# Patient Record
Sex: MALE | Race: WHITE | NOT HISPANIC OR LATINO | Employment: OTHER | URBAN - METROPOLITAN AREA
[De-identification: names, ages, dates, MRNs, and addresses within clinical notes are randomized per-mention and may not be internally consistent; named-entity substitution may affect disease eponyms.]

---

## 2019-05-13 ENCOUNTER — APPOINTMENT (OUTPATIENT)
Dept: GENERAL RADIOLOGY | Facility: HOSPITAL | Age: 64
End: 2019-05-13

## 2019-05-13 ENCOUNTER — HOSPITAL ENCOUNTER (EMERGENCY)
Facility: HOSPITAL | Age: 64
Discharge: HOME | End: 2019-05-13
Attending: EMERGENCY MEDICINE | Admitting: EMERGENCY MEDICINE

## 2019-05-13 VITALS
DIASTOLIC BLOOD PRESSURE: 99 MMHG | WEIGHT: 187 LBS | HEART RATE: 74 BPM | SYSTOLIC BLOOD PRESSURE: 165 MMHG | RESPIRATION RATE: 16 BRPM | TEMPERATURE: 97.2 F | OXYGEN SATURATION: 98 % | BODY MASS INDEX: 28.34 KG/M2 | HEIGHT: 68 IN

## 2019-05-13 DIAGNOSIS — I20.8 STABLE ANGINA (HCC): Primary | ICD-10-CM

## 2019-05-13 LAB
ALBUMIN SERPL-MCNC: 4.4 G/DL (ref 3.5–5.2)
ALBUMIN/GLOB SERPL: 1.8 G/DL
ALP SERPL-CCNC: 64 U/L (ref 39–117)
ALT SERPL W P-5'-P-CCNC: 24 U/L (ref 1–41)
ANION GAP SERPL CALCULATED.3IONS-SCNC: 11.7 MMOL/L
AST SERPL-CCNC: 17 U/L (ref 1–40)
BASOPHILS # BLD AUTO: 0.05 10*3/MM3 (ref 0–0.2)
BASOPHILS NFR BLD AUTO: 0.7 % (ref 0–1.5)
BILIRUB SERPL-MCNC: 0.3 MG/DL (ref 0.2–1.2)
BUN BLD-MCNC: 13 MG/DL (ref 8–23)
BUN/CREAT SERPL: 9 (ref 7–25)
CALCIUM SPEC-SCNC: 9 MG/DL (ref 8.6–10.5)
CHLORIDE SERPL-SCNC: 102 MMOL/L (ref 98–107)
CO2 SERPL-SCNC: 22.3 MMOL/L (ref 22–29)
CREAT BLD-MCNC: 1.44 MG/DL (ref 0.76–1.27)
DEPRECATED RDW RBC AUTO: 42.1 FL (ref 37–54)
EOSINOPHIL # BLD AUTO: 0.11 10*3/MM3 (ref 0–0.4)
EOSINOPHIL NFR BLD AUTO: 1.6 % (ref 0.3–6.2)
ERYTHROCYTE [DISTWIDTH] IN BLOOD BY AUTOMATED COUNT: 12.8 % (ref 12.3–15.4)
GFR SERPL CREATININE-BSD FRML MDRD: 49 ML/MIN/1.73
GLOBULIN UR ELPH-MCNC: 2.4 GM/DL
GLUCOSE BLD-MCNC: 105 MG/DL (ref 65–99)
HCT VFR BLD AUTO: 44.5 % (ref 37.5–51)
HGB BLD-MCNC: 14.7 G/DL (ref 13–17.7)
HOLD SPECIMEN: NORMAL
HOLD SPECIMEN: NORMAL
IMM GRANULOCYTES # BLD AUTO: 0.03 10*3/MM3 (ref 0–0.05)
IMM GRANULOCYTES NFR BLD AUTO: 0.4 % (ref 0–0.5)
INR PPP: 1.13 (ref 0.9–1.1)
LIPASE SERPL-CCNC: 32 U/L (ref 13–60)
LYMPHOCYTES # BLD AUTO: 2.06 10*3/MM3 (ref 0.7–3.1)
LYMPHOCYTES NFR BLD AUTO: 29.9 % (ref 19.6–45.3)
MCH RBC QN AUTO: 29.5 PG (ref 26.6–33)
MCHC RBC AUTO-ENTMCNC: 33 G/DL (ref 31.5–35.7)
MCV RBC AUTO: 89.4 FL (ref 79–97)
MONOCYTES # BLD AUTO: 0.72 10*3/MM3 (ref 0.1–0.9)
MONOCYTES NFR BLD AUTO: 10.4 % (ref 5–12)
NEUTROPHILS # BLD AUTO: 3.93 10*3/MM3 (ref 1.7–7)
NEUTROPHILS NFR BLD AUTO: 57 % (ref 42.7–76)
NRBC BLD AUTO-RTO: 0 /100 WBC (ref 0–0.2)
PLATELET # BLD AUTO: 172 10*3/MM3 (ref 140–450)
PMV BLD AUTO: 11.9 FL (ref 6–12)
POTASSIUM BLD-SCNC: 3.9 MMOL/L (ref 3.5–5.2)
PROT SERPL-MCNC: 6.8 G/DL (ref 6–8.5)
PROTHROMBIN TIME: 14.2 SECONDS (ref 11.7–14.2)
RBC # BLD AUTO: 4.98 10*6/MM3 (ref 4.14–5.8)
SODIUM BLD-SCNC: 136 MMOL/L (ref 136–145)
TROPONIN T SERPL-MCNC: <0.01 NG/ML (ref 0–0.03)
WBC NRBC COR # BLD: 6.9 10*3/MM3 (ref 3.4–10.8)
WHOLE BLOOD HOLD SPECIMEN: NORMAL
WHOLE BLOOD HOLD SPECIMEN: NORMAL

## 2019-05-13 PROCEDURE — 99284 EMERGENCY DEPT VISIT MOD MDM: CPT

## 2019-05-13 PROCEDURE — 83690 ASSAY OF LIPASE: CPT | Performed by: EMERGENCY MEDICINE

## 2019-05-13 PROCEDURE — 84484 ASSAY OF TROPONIN QUANT: CPT | Performed by: EMERGENCY MEDICINE

## 2019-05-13 PROCEDURE — 93010 ELECTROCARDIOGRAM REPORT: CPT | Performed by: INTERNAL MEDICINE

## 2019-05-13 PROCEDURE — 80053 COMPREHEN METABOLIC PANEL: CPT | Performed by: EMERGENCY MEDICINE

## 2019-05-13 PROCEDURE — 93005 ELECTROCARDIOGRAM TRACING: CPT

## 2019-05-13 PROCEDURE — 85025 COMPLETE CBC W/AUTO DIFF WBC: CPT | Performed by: EMERGENCY MEDICINE

## 2019-05-13 PROCEDURE — 93005 ELECTROCARDIOGRAM TRACING: CPT | Performed by: EMERGENCY MEDICINE

## 2019-05-13 PROCEDURE — 71045 X-RAY EXAM CHEST 1 VIEW: CPT

## 2019-05-13 PROCEDURE — 85610 PROTHROMBIN TIME: CPT | Performed by: EMERGENCY MEDICINE

## 2019-05-13 RX ORDER — ISOSORBIDE MONONITRATE 60 MG/1
120 TABLET, EXTENDED RELEASE ORAL DAILY
Qty: 30 TABLET | Refills: 0 | Status: SHIPPED | OUTPATIENT
Start: 2019-05-13 | End: 2020-06-26 | Stop reason: DRUGHIGH

## 2019-05-13 RX ORDER — LOSARTAN POTASSIUM 50 MG/1
25 TABLET ORAL DAILY
COMMUNITY
End: 2021-10-21 | Stop reason: SDUPTHER

## 2019-05-13 RX ORDER — ISOSORBIDE MONONITRATE 60 MG/1
60 TABLET, EXTENDED RELEASE ORAL DAILY
COMMUNITY
End: 2019-05-13 | Stop reason: SDUPTHER

## 2019-05-13 RX ORDER — CLOPIDOGREL BISULFATE 75 MG/1
75 TABLET ORAL DAILY
COMMUNITY
End: 2019-10-22 | Stop reason: SDUPTHER

## 2019-05-13 RX ORDER — LEVOTHYROXINE SODIUM 175 UG/1
137 TABLET ORAL DAILY
COMMUNITY
End: 2021-09-24 | Stop reason: DRUGHIGH

## 2019-05-13 RX ORDER — PANTOPRAZOLE SODIUM 40 MG/1
40 TABLET, DELAYED RELEASE ORAL DAILY
COMMUNITY
End: 2019-10-24 | Stop reason: SDUPTHER

## 2019-05-13 RX ORDER — ASPIRIN 81 MG/1
81 TABLET, CHEWABLE ORAL DAILY
COMMUNITY
End: 2022-04-01 | Stop reason: HOSPADM

## 2019-05-13 RX ORDER — AMLODIPINE BESYLATE 2.5 MG/1
2.5 TABLET ORAL DAILY
COMMUNITY
End: 2019-05-14

## 2019-05-13 RX ADMIN — NITROGLYCERIN 1 INCH: 20 OINTMENT TOPICAL at 13:09

## 2019-05-13 NOTE — ED PROVIDER NOTES
EMERGENCY DEPARTMENT ENCOUNTER    CHIEF COMPLAINT  Chief Complaint: CP  History given by: patient  History limited by: nothing  Room Number: 39/39  PMD: Quang Plasencia MD  Cardiologist- Dr. Crook    HPI:  Pt is a 64 y.o. male who presents complaining of intermittent mid-sternal CP for the last 3-4 days. Pt states that he also has an intermittent, burning left sided chest discomfort. Pt states that his pain is worse with eating. Pt also complains of fatigue and dizziness. Pt states that he took NTG with mild relief of his pain. Pt states that his last cardiac catheterization was several months ago at [facility] and that there were some blockages that they were unable to stent because the artery was too small. Pt states that he called his cardiologist's office and was instructed to come to the ED for further evaluation. Pt is on Plavix for CAD.    Duration:  3-4 days  Onset: gradual  Timing: constant  Location: mid-sternal chest  Radiation: left chest  Quality: burning  Intensity/Severity: moderate  Progression: worsening  Associated Symptoms: fatigue, dizziness  Aggravating Factors: eating  Alleviating Factors: none  Previous Episodes: none mentioned  Treatment before arrival: Pt took NTGx1 with improvement of his CP.    PAST MEDICAL HISTORY  Active Ambulatory Problems     Diagnosis Date Noted   • No Active Ambulatory Problems     Resolved Ambulatory Problems     Diagnosis Date Noted   • No Resolved Ambulatory Problems     Past Medical History:   Diagnosis Date   • Hyperlipidemia    • Hypertension    • Renal disorder        PAST SURGICAL HISTORY  Past Surgical History:   Procedure Laterality Date   • CARDIAC CATHETERIZATION     • CARDIAC SURGERY     • CORONARY ANGIOPLASTY WITH STENT PLACEMENT         FAMILY HISTORY  History reviewed. No pertinent family history.    SOCIAL HISTORY  Social History     Socioeconomic History   • Marital status: Single     Spouse name: Not on file   • Number of children: Not on file    • Years of education: Not on file   • Highest education level: Not on file   Tobacco Use   • Smoking status: Current Every Day Smoker     Packs/day: 0.50     Types: Cigarettes   • Smokeless tobacco: Never Used   Substance and Sexual Activity   • Alcohol use: No     Frequency: Never   • Drug use: No       ALLERGIES  Contrast dye    REVIEW OF SYSTEMS  Review of Systems   Constitutional: Positive for fatigue. Negative for activity change, appetite change and fever.   HENT: Negative for congestion and sore throat.    Eyes: Negative.    Respiratory: Negative for cough and shortness of breath.    Cardiovascular: Positive for chest pain. Negative for leg swelling.   Gastrointestinal: Negative for abdominal pain, diarrhea and vomiting.   Endocrine: Negative.    Genitourinary: Negative for decreased urine volume and dysuria.   Musculoskeletal: Negative for neck pain.   Skin: Negative for rash and wound.   Allergic/Immunologic: Negative.    Neurological: Positive for dizziness. Negative for weakness, numbness and headaches.   Hematological: Negative.    Psychiatric/Behavioral: Negative.    All other systems reviewed and are negative.      PHYSICAL EXAM  ED Triage Vitals [05/13/19 1144]   Temp Heart Rate Resp BP SpO2   97.2 °F (36.2 °C) 89 18 172/94 97 %      Temp src Heart Rate Source Patient Position BP Location FiO2 (%)   Tympanic Monitor -- -- --       Physical Exam   Constitutional: He is oriented to person, place, and time. No distress.   HENT:   Head: Normocephalic and atraumatic.   Mouth/Throat: Oropharynx is clear and moist.   Eyes: EOM are normal. Pupils are equal, round, and reactive to light.   Neck: Normal range of motion. Neck supple.   Cardiovascular: Normal rate, regular rhythm and normal heart sounds.   Pulmonary/Chest: Effort normal and breath sounds normal. No respiratory distress. He exhibits tenderness (mild, reproducible).   Abdominal: Soft. Bowel sounds are normal. There is no tenderness. There is no  rebound and no guarding.   Musculoskeletal: Normal range of motion. He exhibits no edema.   No calf tenderness   Neurological: He is alert and oriented to person, place, and time. He has normal sensation and normal strength.   Skin: Skin is warm and dry.   Psychiatric: Mood and affect normal.   Nursing note and vitals reviewed.      LAB RESULTS  Lab Results (last 24 hours)     Procedure Component Value Units Date/Time    CBC Auto Differential [[id number]]  (Normal) Collected:  05/13/19 1209    Specimen:  Blood Updated:  05/13/19 1354     WBC 6.90 10*3/mm3      RBC 4.98 10*6/mm3      Hemoglobin 14.7 g/dL      Hematocrit 44.5 %      MCV 89.4 fL      MCH 29.5 pg      MCHC 33.0 g/dL      RDW 12.8 %      RDW-SD 42.1 fl      MPV 11.9 fL      Platelets 172 10*3/mm3      Neutrophil % 57.0 %      Lymphocyte % 29.9 %      Monocyte % 10.4 %      Eosinophil % 1.6 %      Basophil % 0.7 %      Immature Grans % 0.4 %      Neutrophils, Absolute 3.93 10*3/mm3      Lymphocytes, Absolute 2.06 10*3/mm3      Monocytes, Absolute 0.72 10*3/mm3      Eosinophils, Absolute 0.11 10*3/mm3      Basophils, Absolute 0.05 10*3/mm3      Immature Grans, Absolute 0.03 10*3/mm3      nRBC 0.0 /100 WBC     Comprehensive Metabolic Panel [[id number]]  (Abnormal) Collected:  05/13/19 1209    Specimen:  Blood Updated:  05/13/19 1330     Glucose 105 mg/dL      BUN 13 mg/dL      Creatinine 1.44 mg/dL      Sodium 136 mmol/L      Potassium 3.9 mmol/L      Chloride 102 mmol/L      CO2 22.3 mmol/L      Calcium 9.0 mg/dL      Total Protein 6.8 g/dL      Albumin 4.40 g/dL      ALT (SGPT) 24 U/L      AST (SGOT) 17 U/L      Alkaline Phosphatase 64 U/L      Total Bilirubin 0.3 mg/dL      eGFR Non African Amer 49 mL/min/1.73      Globulin 2.4 gm/dL      A/G Ratio 1.8 g/dL      BUN/Creatinine Ratio 9.0     Anion Gap 11.7 mmol/L     Narrative:       GFR Normal >60  Chronic Kidney Disease <60  Kidney Failure <15    Lipase [[id number]]  (Normal) Collected:  05/13/19 1207     Specimen:  Blood Updated:  05/13/19 1330     Lipase 32 U/L     Protime-INR [[id number]]  (Abnormal) Collected:  05/13/19 1209    Specimen:  Blood Updated:  05/13/19 1314     Protime 14.2 Seconds      INR 1.13    Troponin [[id number]]  (Normal) Collected:  05/13/19 1209    Specimen:  Blood Updated:  05/13/19 1330     Troponin T <0.010 ng/mL     Narrative:       Troponin T Reference Range:  <= 0.03 ng/mL-   Negative for AMI  >0.03 ng/mL-     Abnormal for myocardial necrosis.  Clinicians would have to utilize clinical acumen, EKG, Troponin and serial changes to determine if it is an Acute Myocardial Infarction or myocardial injury due to an underlying chronic condition.           I ordered the above labs and reviewed the results    RADIOLOGY  XR Chest 1 View            CXR shows chronic changes but nothing acute.    I ordered the above noted radiological studies. Interpreted by radiologist.  Reviewed by me in PACS.       PROCEDURES  Procedures  EKG    EKG time: 1147  Rhythm/Rate: NSR, 67  No Acute Ischemia  Non-Specific ST-T changes    Unchanged compared to prior from [facility] in July 2018    Interpreted Contemporaneously by me.  Independently viewed by me      PROGRESS AND CONSULTS     1208- Discussed the plan to order lab and imaging studies for further evaluation. Pt understands and agrees with the plan, all questions answered.    1210- Ordered blood work, troponin, lipase, PT with INR, EKG, CXR and prior medical records for further evaluation. Ordered NTG paste for CP.    1437- Placed call to Dr. Crook (cardiology) for consult.    1456- Discussed the pt's case with Dr. Crook (cardiology), who states that he knows the pt wel land the pt is stable for discharge with instructions to follow up in the office. He states that the pt has chronic angina and requested that I increase the pt's Imdur from 60mg to 90mg.    1500- Rechecked pt. Pt is resting comfortably. Notified pt of his lab and imaging results, including the  pt's troponin and CXR. Discussed the plan to discharge the pt home with instructions to increase his Imdur to 90mg. Pt states that he is already on 90mg Imdur daily. I instructed the pt to increase his Imdur to 120mg daily. I instructed the pt to follow up with Dr. Crook later this week. Pt understands and agrees with the plan, all questions answered.      MEDICAL DECISION MAKING  Results were reviewed/discussed with the patient and they were also made aware of online access. Pt also made aware that some labs, such as cultures, will not be resulted during ER visit and follow up with PMD is necessary.     MDM  Number of Diagnoses or Management Options  Stable angina (CMS/HCC):      Amount and/or Complexity of Data Reviewed  Clinical lab tests: ordered and reviewed (Troponin=<0.010)  Tests in the radiology section of CPT®: ordered and reviewed (CXR shows chronic changes but nothing acute.)  Tests in the medicine section of CPT®: ordered and reviewed (See EKG procedure note)  Decide to obtain previous medical records or to obtain history from someone other than the patient: yes  Review and summarize past medical records: yes (Pt had an ECHO in July 2018 that showed an EF=60%. )  Discuss the patient with other providers: yes (Dr. Crook (cardiology))  Independent visualization of images, tracings, or specimens: yes    Patient Progress  Patient progress: stable         DIAGNOSIS  Final diagnoses:   Stable angina (CMS/HCC)       DISPOSITION  DISCHARGE    Patient discharged in stable condition.    Reviewed implications of results, diagnosis, meds, responsibility to follow up, warning signs and symptoms of possible worsening, potential complications and reasons to return to ER.    Patient/Family voiced understanding of above instructions.    Discussed plan for discharge, as there is no emergent indication for admission. Patient referred to primary care provider for BP management due to today's BP. Pt/family is  agreeable and understands need for follow up and repeat testing.  Pt is aware that discharge does not mean that nothing is wrong but it indicates no emergency is present that requires admission and they must continue care with follow-up as given below or physician of their choice.     FOLLOW-UP  Yunior Crook MD  [address] [place] [id number] [phone]    Call in 1 day  to schedule a follow up appointment later this week         Medication List      Changed    isosorbide mononitrate 60 MG 24 hr tablet  Commonly known as:  IMDUR  Take 2 tablets by mouth Daily. .  What changed:    how much to take  additional instructions              Latest Documented Vital Signs:  As of 3:04 PM  BP- 165/99 HR- 74 Temp- 97.2 °F (36.2 °C) (Tympanic) O2 sat- 97%    --  Documentation assistance provided by tori Sher for Dr. Pineda.  Information recorded by the scribe was done at my direction and has been verified and validated by me.     Mayela Sher  05/13/19 [id number]       Landon Pineda MD  05/13/19 [id number]

## 2019-05-14 PROBLEM — E03.9 HYPOTHYROIDISM: Status: ACTIVE | Noted: 2019-05-14

## 2019-05-14 PROBLEM — I25.10 CAD (CORONARY ARTERY DISEASE): Status: ACTIVE | Noted: 2019-05-14

## 2019-05-14 PROBLEM — N18.1 CKD (CHRONIC KIDNEY DISEASE) STAGE 1, GFR 90 ML/MIN OR GREATER: Status: ACTIVE | Noted: 2019-05-14

## 2019-05-14 PROBLEM — K21.9 GERD (GASTROESOPHAGEAL REFLUX DISEASE): Status: ACTIVE | Noted: 2019-05-14

## 2019-05-14 PROBLEM — E78.5 HYPERLIPIDEMIA LDL GOAL <100: Status: ACTIVE | Noted: 2019-05-14

## 2019-05-14 RX ORDER — AMLODIPINE BESYLATE 5 MG/1
2.5 TABLET ORAL DAILY
COMMUNITY
End: 2020-02-14 | Stop reason: SDUPTHER

## 2019-05-14 RX ORDER — FUROSEMIDE 20 MG/1
20 TABLET ORAL DAILY
COMMUNITY
End: 2019-05-15

## 2019-05-15 ENCOUNTER — OFFICE VISIT (OUTPATIENT)
Dept: CARDIOLOGY | Facility: CLINIC | Age: 64
End: 2019-05-15

## 2019-05-15 VITALS
DIASTOLIC BLOOD PRESSURE: 80 MMHG | HEART RATE: 72 BPM | BODY MASS INDEX: 28.95 KG/M2 | HEIGHT: 68 IN | WEIGHT: 191 LBS | SYSTOLIC BLOOD PRESSURE: 150 MMHG

## 2019-05-15 DIAGNOSIS — I10 ESSENTIAL HYPERTENSION: ICD-10-CM

## 2019-05-15 DIAGNOSIS — N18.1 CKD (CHRONIC KIDNEY DISEASE) STAGE 1, GFR 90 ML/MIN OR GREATER: ICD-10-CM

## 2019-05-15 DIAGNOSIS — R07.2 PRECORDIAL PAIN: ICD-10-CM

## 2019-05-15 DIAGNOSIS — Z72.0 TOBACCO ABUSE DISORDER: ICD-10-CM

## 2019-05-15 DIAGNOSIS — I25.118 CORONARY ARTERY DISEASE OF NATIVE ARTERY OF NATIVE HEART WITH STABLE ANGINA PECTORIS (HCC): Primary | ICD-10-CM

## 2019-05-15 DIAGNOSIS — E78.5 HYPERLIPIDEMIA LDL GOAL <100: ICD-10-CM

## 2019-05-15 PROCEDURE — 99214 OFFICE O/P EST MOD 30 MIN: CPT | Performed by: INTERNAL MEDICINE

## 2019-05-15 NOTE — PROGRESS NOTES
Subjective:     Encounter Date:05/15/2019      Patient ID: Rishi Galicia is a 64 y.o. male.    Chief Complaint:  Chief Complaint   Patient presents with   • Chest Pain       HPI:  I saw Rishi Galicia today for evaluation of chest discomfort.  He is a pleasant 64-year-old male with a history of coronary heart disease.  He is undergone previous coronary artery bypass surgery as well as multiple prior percutaneous coronary interventions.  He reports mid chest discomfort that radiates to the left chest.  It is described as a burning throbbing sensation.  The duration is for seconds and can occur at rest or with exertion.  He denies associated shortness of breath diaphoresis or nausea.  The frequency is daily.  He denies any progressive dyspnea on exertion and does not report orthopnea or PND there is no syncope near syncope or palpitation.  He is tolerating his current medical regimen well.    Mr. Galicia has a history of hypertension which is moderately elevated today.  In addition he has a history of tobacco abuse which continues at 1/2 pack/day.  He is known to have dyslipidemia which is under treatment with Praluent.    The following portions of the patient's history were reviewed and updated as appropriate: allergies, current medications, past family history, past medical history, past social history, past surgical history and problem list.    Problem List:  Patient Active Problem List   Diagnosis   • CAD (coronary artery disease)   • CKD (chronic kidney disease) stage 1, GFR 90 ml/min or greater   • Hyperlipidemia LDL goal <100   • Hypothyroidism   • GERD (gastroesophageal reflux disease)       Past Medical History:  Past Medical History:   Diagnosis Date   • CAD (coronary artery disease)    • Chronic kidney disease    • Chronic kidney disease    • GERD (gastroesophageal reflux disease) 5/14/2019   • H/O echocardiogram 07/18/2018    LV size normal.  EF 60-65%.  Basal posterior wall hypokinesis.  AV is  "trileaflet.  Mild MR and TR.     • Hyperlipidemia    • Hypertension    • Peripheral vascular disease (CMS/HCC)    • Renal disorder        Past Surgical History:  Past Surgical History:   Procedure Laterality Date   • CARDIAC CATHETERIZATION  07/20/2018    Native 3 vessel disease.  Patent stent in the LCx.  native  LAD, non-dominant RCA has .  Patent LIMA to LAD, SVG-OM grafts.     • CARDIAC SURGERY     • CORONARY ANGIOPLASTY WITH STENT PLACEMENT     • CORONARY ANGIOPLASTY WITH STENT PLACEMENT      4/25/16 - SYNERGY stent to the distal left PDA  and distal and mid LIMA to LAD.  .  12/2/15 -  PROMUS stent ot the first obtuse marginal branch via SVG.     • CORONARY ARTERY BYPASS GRAFT  2009   • ILIAC ARTERY STENT  2016    S/P right common iliac artery stent placement and balloon angioplasty with no significant residual stenosis and balloon angioplasty of the left tibioperoneal trunk with no significant residual stenosis.  Performed b Dr. Carlo Grijalva.       Social History:  Social History     Socioeconomic History   • Marital status: Single     Spouse name: Not on file   • Number of children: Not on file   • Years of education: Not on file   • Highest education level: Not on file   Tobacco Use   • Smoking status: Current Every Day Smoker     Packs/day: 0.50     Types: Cigarettes   • Smokeless tobacco: Never Used   Substance and Sexual Activity   • Alcohol use: No     Frequency: Never   • Drug use: No       Allergies:  Allergies   Allergen Reactions   • Contrast Dye Hives   • Ranexa [Ranolazine Er] Unknown (See Comments)     unknown         ROS:  ROS       Objective:         /80   Pulse 72   Ht 172.7 cm (68\")   Wt 86.6 kg (191 lb)   BMI 29.04 kg/m²     Physical Exam    In-Office Procedure(s):  Procedures    ASCVD RIsk Score::  The ASCVD Risk score ([place] CORBIN Mcclain, et al., 2013) failed to calculate for the following reasons:    Cannot find a previous HDL lab    Cannot find a previous total cholesterol " lab    Recent Radiology:  Imaging Results (most recent)     None          Lab Review:   not applicable     Results from last 7 days   Lab Units 05/13/19  1209   SODIUM mmol/L 136   POTASSIUM mmol/L 3.9   CHLORIDE mmol/L 102   CO2 mmol/L 22.3   BUN mg/dL 13   CREATININE mg/dL 1.44*   GLUCOSE mg/dL 105*   CALCIUM mg/dL 9.0   AST (SGOT) U/L 17   ALT (SGPT) U/L 24     Results from last 7 days   Lab Units 05/13/19  1209   TROPONIN T ng/mL <0.010     Results from last 7 days   Lab Units 05/13/19  1209   WBC 10*3/mm3 6.90   HEMOGLOBIN g/dL 14.7   HEMATOCRIT % 44.5   PLATELETS 10*3/mm3 172     Results from last 7 days   Lab Units 05/13/19  1209   INR  1.13*         Assessment:         Problems Addressed this Visit        Cardiovascular and Mediastinum    CAD (coronary artery disease) - Primary    Relevant Medications    amLODIPine (NORVASC) 5 MG tablet    Hyperlipidemia LDL goal <100       Genitourinary    CKD (chronic kidney disease) stage 1, GFR 90 ml/min or greater      Other Visit Diagnoses     Precordial pain        Essential hypertension        Relevant Medications    amLODIPine (NORVASC) 5 MG tablet    Tobacco abuse disorder                 Plan:       I have counseled Mr. Galicia in regards to smoking cessation.  We have discussed the importance of continued risk modification.  In addition we discussed further evaluation of his chest discomfort which has primarily atypical character.  We will proceed to stress nuclear noninvasive ischemic assessment.             Yunior Crook MD  05/15/19  .

## 2019-06-04 ENCOUNTER — HOSPITAL ENCOUNTER (OUTPATIENT)
Dept: NUCLEAR MEDICINE | Facility: HOSPITAL | Age: 64
Discharge: HOME | End: 2019-06-04

## 2019-06-04 DIAGNOSIS — Z72.0 TOBACCO ABUSE DISORDER: ICD-10-CM

## 2019-06-04 DIAGNOSIS — I25.118 CORONARY ARTERY DISEASE OF NATIVE ARTERY OF NATIVE HEART WITH STABLE ANGINA PECTORIS (HCC): ICD-10-CM

## 2019-06-04 DIAGNOSIS — R07.2 PRECORDIAL PAIN: ICD-10-CM

## 2019-06-04 PROCEDURE — 0 TECHNETIUM SESTAMIBI: Performed by: INTERNAL MEDICINE

## 2019-06-04 PROCEDURE — 93017 CV STRESS TEST TRACING ONLY: CPT

## 2019-06-04 PROCEDURE — A9500 TC99M SESTAMIBI: HCPCS | Performed by: INTERNAL MEDICINE

## 2019-06-04 PROCEDURE — 25010000002 REGADENOSON 0.4 MG/5ML SOLUTION: Performed by: INTERNAL MEDICINE

## 2019-06-04 PROCEDURE — 93016 CV STRESS TEST SUPVJ ONLY: CPT | Performed by: INTERNAL MEDICINE

## 2019-06-04 PROCEDURE — 78452 HT MUSCLE IMAGE SPECT MULT: CPT | Performed by: INTERNAL MEDICINE

## 2019-06-04 PROCEDURE — 78452 HT MUSCLE IMAGE SPECT MULT: CPT

## 2019-06-04 PROCEDURE — 93018 CV STRESS TEST I&R ONLY: CPT | Performed by: INTERNAL MEDICINE

## 2019-06-04 RX ADMIN — TECHNETIUM TC 99M SESTAMIBI 1 DOSE: 1 INJECTION INTRAVENOUS at 06:57

## 2019-06-04 RX ADMIN — REGADENOSON 0.4 MG: 0.08 INJECTION, SOLUTION INTRAVENOUS at 11:10

## 2019-06-04 RX ADMIN — TECHNETIUM TC 99M SESTAMIBI 1 DOSE: 1 INJECTION INTRAVENOUS at 11:10

## 2019-06-07 LAB
BH CV STRESS COMMENTS STAGE 1: NORMAL
BH CV STRESS DOSE REGADENOSON STAGE 1: 0.4
BH CV STRESS DURATION MIN STAGE 1: 0
BH CV STRESS DURATION SEC STAGE 1: 10
BH CV STRESS PROTOCOL 1: NORMAL
BH CV STRESS RECOVERY BP: NORMAL MMHG
BH CV STRESS RECOVERY HR: 80 BPM
BH CV STRESS STAGE 1: 1
LV EF NUC BP: 74 %
MAXIMAL PREDICTED HEART RATE: 155 BPM
PERCENT MAX PREDICTED HR: 62.58 %
STRESS BASELINE BP: NORMAL MMHG
STRESS BASELINE HR: 64 BPM
STRESS PERCENT HR: 74 %
STRESS POST PEAK BP: NORMAL MMHG
STRESS POST PEAK HR: 97 BPM
STRESS TARGET HR: 132 BPM

## 2019-08-14 ENCOUNTER — OFFICE VISIT (OUTPATIENT)
Dept: CARDIOLOGY | Facility: CLINIC | Age: 64
End: 2019-08-14

## 2019-08-14 VITALS
HEART RATE: 72 BPM | SYSTOLIC BLOOD PRESSURE: 130 MMHG | HEIGHT: 68 IN | WEIGHT: 189 LBS | BODY MASS INDEX: 28.64 KG/M2 | DIASTOLIC BLOOD PRESSURE: 84 MMHG

## 2019-08-14 DIAGNOSIS — N18.1 CKD (CHRONIC KIDNEY DISEASE) STAGE 1, GFR 90 ML/MIN OR GREATER: ICD-10-CM

## 2019-08-14 DIAGNOSIS — Z72.0 TOBACCO ABUSE: ICD-10-CM

## 2019-08-14 DIAGNOSIS — I25.118 CORONARY ARTERY DISEASE OF NATIVE ARTERY OF NATIVE HEART WITH STABLE ANGINA PECTORIS (HCC): Primary | ICD-10-CM

## 2019-08-14 DIAGNOSIS — E78.5 HYPERLIPIDEMIA LDL GOAL <100: ICD-10-CM

## 2019-08-14 PROCEDURE — 99213 OFFICE O/P EST LOW 20 MIN: CPT | Performed by: INTERNAL MEDICINE

## 2019-08-14 NOTE — PROGRESS NOTES
[facility]        Subjective:     Encounter Date:08/14/2019      Patient ID: Rishi Galicia is a 64 y.o. male.      HPI: I saw Rishi Galicia today for cardiovascular care.  He is a pleasant 64-year-old male with a history of coronary heart disease.  He has undergone previous coronary artery bypass surgery in 2009.  He has had previous percutaneous coronary interventions as well coronary angiography 7/20/2018 revealed significant three-vessel native coronary disease, patent left SHINE to the LAD and saphenous vein graft to the marginal branch.  Right coronary artery is nondominant and has a chronic total occlusion.  H reports stable angina class I.e he has baseline dyspnea on exertion without significant change.  He is free of orthopnea or PND there is no syncope near syncope or palpitation.  Unfortunately he has continued tobacco use up to three fourths of a pack per day.  He has a history of dyslipidemia which he has demonstrated statin intolerance in the past.  Most recently he has been on Repatha which she had been discontinued due to muscle spasms.  His lipids are followed by Dr. [email or web address] he has a history of hypertension which is controlled.      The following portions of the patient's history were reviewed and updated as appropriate: allergies, current medications, past family history, past medical history, past social history, past surgical history and problem list.    Problem List:  Patient Active Problem List   Diagnosis   • CAD (coronary artery disease)   • CKD (chronic kidney disease) stage 1, GFR 90 ml/min or greater   • Hyperlipidemia LDL goal <100   • Hypothyroidism   • GERD (gastroesophageal reflux disease)   • Tobacco abuse       Past Medical History:  Past Medical History:   Diagnosis Date   • CAD (coronary artery disease)    • Chronic kidney disease    • Chronic kidney disease    • GERD (gastroesophageal reflux disease) 5/14/2019   • H/O echocardiogram 07/18/2018    LV size  normal.  EF 60-65%.  Basal posterior wall hypokinesis.  AV is trileaflet.  Mild MR and TR.     • Hyperlipidemia    • Hypertension    • Peripheral vascular disease (CMS/HCC)    • Renal disorder    • Tobacco abuse        Past Surgical History:  Past Surgical History:   Procedure Laterality Date   • CARDIAC CATHETERIZATION  07/20/2018    Native 3 vessel disease.  Patent stent in the LCx.  native  LAD, non-dominant RCA has .  Patent LIMA to LAD, SVG-OM grafts.     • CARDIAC SURGERY     • CORONARY ANGIOPLASTY WITH STENT PLACEMENT     • CORONARY ANGIOPLASTY WITH STENT PLACEMENT      4/25/16 - SYNERGY stent to the distal left PDA  and distal and mid LIMA to LAD.  .  12/2/15 -  PROMUS stent ot the first obtuse marginal branch via SVG.     • CORONARY ARTERY BYPASS GRAFT  2009   • ILIAC ARTERY STENT  2016    S/P right common iliac artery stent placement and balloon angioplasty with no significant residual stenosis and balloon angioplasty of the left tibioperoneal trunk with no significant residual stenosis.  Performed b Dr. Carlo Grijalva.       Social History:  Social History     Socioeconomic History   • Marital status: Single     Spouse name: Not on file   • Number of children: Not on file   • Years of education: Not on file   • Highest education level: Not on file   Tobacco Use   • Smoking status: Current Every Day Smoker     Packs/day: 0.50     Types: Cigarettes   • Smokeless tobacco: Never Used   Substance and Sexual Activity   • Alcohol use: No     Frequency: Never   • Drug use: No       Allergies:  Allergies   Allergen Reactions   • Contrast Dye Hives   • Ranexa [Ranolazine Er] Unknown (See Comments)     unknown         ROS:  Review of Systems   Constitution: Negative for weakness and malaise/fatigue.   HENT: Negative for hearing loss and nosebleeds.    Eyes: Negative for double vision and visual disturbance.   Cardiovascular: Positive for chest pain. Negative for claudication, dyspnea on exertion, near-syncope,  "orthopnea, palpitations, paroxysmal nocturnal dyspnea and syncope.   Respiratory: Negative for cough, shortness of breath, sleep disturbances due to breathing, snoring, sputum production and wheezing.    Endocrine: Negative for cold intolerance, heat intolerance, polydipsia and polyuria.   Hematologic/Lymphatic: Negative for adenopathy and bleeding problem. Does not bruise/bleed easily.   Skin: Negative for flushing and itching.   Musculoskeletal: Negative for back pain, falls, joint pain, joint swelling, muscle weakness and neck pain.   Gastrointestinal: Negative for abdominal pain, dysphagia, heartburn, nausea and vomiting.   Genitourinary: Negative for dysuria and frequency.   Neurological: Negative for disturbances in coordination, dizziness, light-headedness, loss of balance and numbness.   Psychiatric/Behavioral: Negative for altered mental status and memory loss. The patient is not nervous/anxious.    Allergic/Immunologic: Negative for hives and persistent infections.          Objective:         /84   Pulse 72   Ht 172.7 cm (68\")   Wt 85.7 kg (189 lb)   BMI 28.74 kg/m²     Physical Exam   Constitutional: He is oriented to person, place, and time. He appears well-developed and well-nourished.   HENT:   Head: Normocephalic and atraumatic.   Eyes: Conjunctivae and EOM are normal. Pupils are equal, round, and reactive to light.   Neck: Neck supple. No thyromegaly present.   Cardiovascular: Normal rate, regular rhythm, S1 normal, S2 normal and intact distal pulses. PMI is not displaced. Exam reveals gallop and S4. Exam reveals no S3, no distant heart sounds, no friction rub, no midsystolic click and no opening snap.   No murmur heard.  Pulses:       Carotid pulses are 2+ on the right side, and 2+ on the left side.       Radial pulses are 2+ on the right side, and 2+ on the left side.        Femoral pulses are 2+ on the right side, and 2+ on the left side.       Dorsalis pedis pulses are 2+ on the right " side, and 2+ on the left side.        Posterior tibial pulses are 2+ on the right side, and 2+ on the left side.   Pulmonary/Chest: Effort normal and breath sounds normal. No respiratory distress. He has no wheezes. He has no rales.   Abdominal: Soft. Bowel sounds are normal. He exhibits no distension and no mass. There is no tenderness.   Musculoskeletal: Normal range of motion. He exhibits no edema.   Neurological: He is alert and oriented to person, place, and time.   Skin: Skin is warm and dry. No erythema.   Psychiatric: He has a normal mood and affect.       In-Office Procedure(s):  Procedures    ASCVD RIsk Score::  The ASCVD Risk score ([place] CORBIN Jr., et al., 2013) failed to calculate for the following reasons:    Cannot find a previous HDL lab    Cannot find a previous total cholesterol lab        Assessment/Plan:         1. Coronary artery disease of native artery of native heart with stable angina pectoris (CMS/[facility])  Stable class I angina    2. Hyperlipidemia LDL goal <100  Intolerant to statin or Repatha    3. CKD (chronic kidney disease) stage 1, GFR 90 ml/min or greater  Stable    4. Tobacco abuse  Continue    I feel Mr. Galicia has stable angina and is euvolemic.  He is maintaining good activity level.  I have counseled him in the importance of risk modification, patient is in his case primarily the discontinuation of tobacco use.  He previously tolerated Praluent for lipid control.  He will discuss this with Dr. Elisa main.  I will see him in follow-up in 6 months sooner if needed.           Yunior Crook MD  08/14/19  .

## 2019-10-22 DIAGNOSIS — I25.118 CORONARY ARTERY DISEASE OF NATIVE ARTERY OF NATIVE HEART WITH STABLE ANGINA PECTORIS (HCC): Primary | ICD-10-CM

## 2019-10-22 RX ORDER — CLOPIDOGREL BISULFATE 75 MG/1
75 TABLET ORAL DAILY
Qty: 90 TABLET | Refills: 1 | Status: SHIPPED | OUTPATIENT
Start: 2019-10-22 | End: 2020-04-03

## 2019-10-24 DIAGNOSIS — K21.9 GASTROESOPHAGEAL REFLUX DISEASE WITHOUT ESOPHAGITIS: Primary | ICD-10-CM

## 2019-10-24 RX ORDER — PANTOPRAZOLE SODIUM 40 MG/1
40 TABLET, DELAYED RELEASE ORAL DAILY
Qty: 90 TABLET | Refills: 1 | Status: SHIPPED | OUTPATIENT
Start: 2019-10-24 | End: 2020-04-03

## 2020-02-14 ENCOUNTER — OFFICE VISIT (OUTPATIENT)
Dept: CARDIOLOGY | Facility: CLINIC | Age: 65
End: 2020-02-14

## 2020-02-14 VITALS
DIASTOLIC BLOOD PRESSURE: 70 MMHG | SYSTOLIC BLOOD PRESSURE: 134 MMHG | BODY MASS INDEX: 28.95 KG/M2 | HEART RATE: 80 BPM | HEIGHT: 68 IN | RESPIRATION RATE: 16 BRPM | WEIGHT: 191 LBS

## 2020-02-14 DIAGNOSIS — I73.9 PERIPHERAL ARTERY DISEASE (HCC): ICD-10-CM

## 2020-02-14 DIAGNOSIS — N18.1 CKD (CHRONIC KIDNEY DISEASE) STAGE 1, GFR 90 ML/MIN OR GREATER: ICD-10-CM

## 2020-02-14 DIAGNOSIS — I25.118 CORONARY ARTERY DISEASE OF NATIVE ARTERY OF NATIVE HEART WITH STABLE ANGINA PECTORIS (HCC): ICD-10-CM

## 2020-02-14 DIAGNOSIS — I10 ESSENTIAL HYPERTENSION: ICD-10-CM

## 2020-02-14 DIAGNOSIS — R07.89 CHEST PAIN, ATYPICAL: Primary | ICD-10-CM

## 2020-02-14 DIAGNOSIS — E78.5 HYPERLIPIDEMIA LDL GOAL <100: ICD-10-CM

## 2020-02-14 DIAGNOSIS — Z72.0 TOBACCO ABUSE: ICD-10-CM

## 2020-02-14 PROCEDURE — 99214 OFFICE O/P EST MOD 30 MIN: CPT | Performed by: INTERNAL MEDICINE

## 2020-02-14 PROCEDURE — 93000 ELECTROCARDIOGRAM COMPLETE: CPT | Performed by: INTERNAL MEDICINE

## 2020-02-14 RX ORDER — NITROGLYCERIN 0.4 MG/1
TABLET SUBLINGUAL
Qty: 25 TABLET | Refills: 2 | Status: SHIPPED | OUTPATIENT
Start: 2020-02-14

## 2020-02-14 RX ORDER — LANOLIN ALCOHOL/MO/W.PET/CERES
1000 CREAM (GRAM) TOPICAL DAILY
COMMUNITY
End: 2022-08-02

## 2020-02-14 RX ORDER — AMLODIPINE BESYLATE 5 MG/1
2.5 TABLET ORAL DAILY
Qty: 45 TABLET | Refills: 1 | Status: SHIPPED | OUTPATIENT
Start: 2020-02-14 | End: 2020-07-06

## 2020-02-14 RX ORDER — TAMSULOSIN HYDROCHLORIDE 0.4 MG/1
1 CAPSULE ORAL DAILY
COMMUNITY
End: 2021-03-30

## 2020-02-14 NOTE — PROGRESS NOTES
[facility]        Subjective:     Encounter Date:02/14/2020      Patient ID: Rishi Galicia is a 64 y.o. male.      HPI: I saw Rishi Galicia today for cardiovascular care.  He is a pleasant 64-year-old male with known coronary heart disease status post coronary artery bypass surgery in 2009 and several previous percutaneous coronary intervention.  He reports maintaining a reasonably good activity level but does have occasional chest discomfort.  It is located in the mid chest and described as a soreness.  This can occur at rest as well as with exertion.  He states it is not like he has his previous angina.  He continues to smoke half a pack of cigarettes per day.  He has his baseline dyspnea on exertion but is free of orthopnea or PND.  There is no syncope near syncope or significant palpitations.  He tolerates his medications well including Repatha.      The following portions of the patient's history were reviewed and updated as appropriate: allergies, current medications, past family history, past medical history, past social history, past surgical history and problem list.    Problem List:  Patient Active Problem List   Diagnosis   • Coronary artery disease of native artery of native heart with stable angina pectoris (CMS/[facility])   • CKD (chronic kidney disease) stage 1, GFR 90 ml/min or greater   • Hyperlipidemia LDL goal <100   • Hypothyroidism   • GERD (gastroesophageal reflux disease)   • Tobacco abuse       Past Medical History:  Past Medical History:   Diagnosis Date   • CAD (coronary artery disease)    • Chronic kidney disease    • Chronic kidney disease    • GERD (gastroesophageal reflux disease) 5/14/2019   • H/O echocardiogram 07/18/2018    LV size normal.  EF 60-65%.  Basal posterior wall hypokinesis.  AV is trileaflet.  Mild MR and TR.     • Hyperlipidemia    • Hypertension    • Peripheral vascular disease (CMS/HCC)    • Renal disorder    • Tobacco abuse        Past Surgical  History:  Past Surgical History:   Procedure Laterality Date   • CARDIAC CATHETERIZATION  07/20/2018    Native 3 vessel disease.  Patent stent in the LCx.  native  LAD, non-dominant RCA has .  Patent LIMA to LAD, SVG-OM grafts.     • CARDIAC SURGERY     • CORONARY ANGIOPLASTY WITH STENT PLACEMENT     • CORONARY ANGIOPLASTY WITH STENT PLACEMENT      4/25/16 - SYNERGY stent to the distal left PDA  and distal and mid LIMA to LAD.  .  12/2/15 -  PROMUS stent ot the first obtuse marginal branch via SVG.     • CORONARY ARTERY BYPASS GRAFT  2009   • ILIAC ARTERY STENT  2016    S/P right common iliac artery stent placement and balloon angioplasty with no significant residual stenosis and balloon angioplasty of the left tibioperoneal trunk with no significant residual stenosis.  Performed b Dr. Carlo Grijalva.       Social History:  Social History     Socioeconomic History   • Marital status: Single     Spouse name: Not on file   • Number of children: Not on file   • Years of education: Not on file   • Highest education level: Not on file   Tobacco Use   • Smoking status: Current Every Day Smoker     Packs/day: 0.50     Types: Cigarettes   • Smokeless tobacco: Never Used   Substance and Sexual Activity   • Alcohol use: No     Frequency: Never   • Drug use: No       Allergies:  Allergies   Allergen Reactions   • Contrast Dye Hives   • Ranexa [Ranolazine Er] Unknown (See Comments)     unknown         ROS:  Review of Systems   Constitution: Negative for malaise/fatigue.   HENT: Negative for hearing loss and nosebleeds.    Eyes: Negative for double vision and visual disturbance.   Cardiovascular: Positive for chest pain and dyspnea on exertion. Negative for claudication, near-syncope, orthopnea, palpitations, paroxysmal nocturnal dyspnea and syncope.   Respiratory: Negative for cough, shortness of breath, sleep disturbances due to breathing, snoring, sputum production and wheezing.    Endocrine: Negative for cold intolerance,  "heat intolerance, polydipsia and polyuria.   Hematologic/Lymphatic: Negative for adenopathy and bleeding problem. Does not bruise/bleed easily.   Skin: Negative for flushing and itching.   Musculoskeletal: Negative for back pain, falls, joint pain, joint swelling, muscle weakness and neck pain.   Gastrointestinal: Negative for abdominal pain, dysphagia, heartburn, nausea and vomiting.   Genitourinary: Negative for dysuria and frequency.   Neurological: Negative for disturbances in coordination, dizziness, light-headedness, loss of balance, numbness and weakness.   Psychiatric/Behavioral: Negative for altered mental status and memory loss. The patient is not nervous/anxious.    Allergic/Immunologic: Negative for hives and persistent infections.          Objective:         /70   Pulse 80   Resp 16   Ht 172.7 cm (68\")   Wt 86.6 kg (191 lb)   BMI 29.04 kg/m²     Physical Exam   Constitutional: He is oriented to person, place, and time. He appears well-developed and well-nourished.   HENT:   Head: Normocephalic and atraumatic.   Eyes: Pupils are equal, round, and reactive to light. Conjunctivae and EOM are normal.   Neck: Neck supple. No thyromegaly present.   Cardiovascular: Normal rate, regular rhythm, S1 normal, S2 normal and intact distal pulses. PMI is not displaced. Exam reveals gallop and S4. Exam reveals no S3, no distant heart sounds, no friction rub, no midsystolic click and no opening snap.   No murmur heard.  Pulses:       Carotid pulses are 2+ on the right side, and 2+ on the left side.       Radial pulses are 2+ on the right side, and 2+ on the left side.        Femoral pulses are 2+ on the right side, and 2+ on the left side.       Dorsalis pedis pulses are 2+ on the right side, and 2+ on the left side.        Posterior tibial pulses are 2+ on the right side, and 2+ on the left side.   Pulmonary/Chest: Effort normal and breath sounds normal. No respiratory distress. He has no wheezes. He has no " rales.   Abdominal: Soft. Bowel sounds are normal. He exhibits no distension and no mass. There is no tenderness.   Musculoskeletal: Normal range of motion. He exhibits no edema.   Neurological: He is alert and oriented to person, place, and time.   Skin: Skin is warm and dry. No erythema.   Psychiatric: He has a normal mood and affect.       In-Office Procedure(s):    ECG 12 Lead  Date/Time: 2/14/2020 1:23 PM  Performed by: Yunior Crook MD  Authorized by: Yunior Crook MD   Comparison: compared with previous ECG from 5/13/2019  Comparison to previous ECG: No significant change  Rhythm: sinus rhythm  BPM: 86  Other findings: left atrial abnormality    Clinical impression: non-specific ECG            ASCVD RIsk Score::  The ASCVD Risk score (Maximilian DC Jr., et al., 2013) failed to calculate for the following reasons:    Cannot find a previous HDL lab    Cannot find a previous total cholesterol lab        Assessment/Plan:         1. Coronary artery disease of native artery of native heart with stable angina pectoris (CMS/HCC)  Stable class I-II angina    2. Essential hypertension  Controlled    3. Hyperlipidemia LDL goal <70  Continue low-cholesterol low saturated fat diet and PCSK9 inhibitor    4. Tobacco abuse  Encouraged to discontinue    5. CKD (chronic kidney disease) stage 1, GFR 90 ml/min or greater  Stable    6. Peripheral artery disease (CMS/HCC)  Stable    Mr. Galicia has known coronary artery disease with stable symptoms of angina.  His EKG is free of any significant changes.  I encouraged him to restrict the salt in his diet is close to 2000 mg daily as possible.  I have also counseled him on the importance of discontinuing tobacco abuse completely.  I have encouraged him to advance his activity level and observe a low-cholesterol low saturated fat diet.           Yunior Crook MD  02/14/20  .

## 2020-04-03 DIAGNOSIS — K21.9 GASTROESOPHAGEAL REFLUX DISEASE WITHOUT ESOPHAGITIS: ICD-10-CM

## 2020-04-03 DIAGNOSIS — I25.118 CORONARY ARTERY DISEASE OF NATIVE ARTERY OF NATIVE HEART WITH STABLE ANGINA PECTORIS (HCC): ICD-10-CM

## 2020-04-03 RX ORDER — PANTOPRAZOLE SODIUM 40 MG/1
40 TABLET, DELAYED RELEASE ORAL DAILY
Qty: 90 TABLET | Refills: 1 | Status: SHIPPED | OUTPATIENT
Start: 2020-04-03 | End: 2020-10-07

## 2020-04-03 RX ORDER — CLOPIDOGREL BISULFATE 75 MG/1
75 TABLET ORAL DAILY
Qty: 90 TABLET | Refills: 1 | Status: SHIPPED | OUTPATIENT
Start: 2020-04-03 | End: 2020-10-07

## 2020-05-08 ENCOUNTER — TELEMEDICINE (OUTPATIENT)
Dept: CARDIOLOGY | Facility: CLINIC | Age: 65
End: 2020-05-08

## 2020-05-08 VITALS
SYSTOLIC BLOOD PRESSURE: 125 MMHG | BODY MASS INDEX: 28.85 KG/M2 | WEIGHT: 190.4 LBS | DIASTOLIC BLOOD PRESSURE: 79 MMHG | HEIGHT: 68 IN | HEART RATE: 74 BPM

## 2020-05-08 DIAGNOSIS — N18.1 CKD (CHRONIC KIDNEY DISEASE) STAGE 1, GFR 90 ML/MIN OR GREATER: ICD-10-CM

## 2020-05-08 DIAGNOSIS — Z72.0 TOBACCO ABUSE: ICD-10-CM

## 2020-05-08 DIAGNOSIS — I25.118 CORONARY ARTERY DISEASE OF NATIVE ARTERY OF NATIVE HEART WITH STABLE ANGINA PECTORIS (HCC): Primary | ICD-10-CM

## 2020-05-08 DIAGNOSIS — E78.5 HYPERLIPIDEMIA LDL GOAL <100: ICD-10-CM

## 2020-05-08 PROCEDURE — 99213 OFFICE O/P EST LOW 20 MIN: CPT | Performed by: INTERNAL MEDICINE

## 2020-05-08 NOTE — PROGRESS NOTES
\[facility]\"" [facility]    You have chosen to receive care through a telehealth visit.  Do you consent to use a video/audio connection for your medical care today? Yes    Subjective:     Encounter Date:05/08/2020      Patient ID: Rishi Galicia is a 65 y.o. male.      HPI: Rishi Galicia agreed to a video visit today secondary to the coronavirus pandemic.  He is a pleasant 65-year-old male who remains free of cough or increased shortness of breath.  He does not report any change in his sense of smell or taste.  He has been following the CDC guidelines for social distancing.  He does report a low-grade fever and has undergone evaluation by his primary care physician.    Mr. Galicia is free of angina.  He does not report any progressive dyspnea on exertion and does not experience orthopnea or PND and no lower extremity edema.  He denies syncope near syncope or palpitation.  He continues to tolerate his current medical regimen well without significant side effects.  He has a history of dyslipidemia and is on a PCSK9 inhibitor.  His lipids are followed by his endocrinologist.  He continues to smoke half a pack of cigarettes daily.    The following portions of the patient's history were reviewed and updated as appropriate: allergies, current medications, past family history, past medical history, past social history, past surgical history and problem list.    Problem List:  Patient Active Problem List   Diagnosis   • Coronary artery disease of native artery of native heart with stable angina pectoris (CMS/[facility])   • CKD (chronic kidney disease) stage 1, GFR 90 ml/min or greater   • Hyperlipidemia LDL goal <100   • Hypothyroidism   • GERD (gastroesophageal reflux disease)   • Tobacco abuse       Past Medical History:  Past Medical History:   Diagnosis Date   • CAD (coronary artery disease)    • Chronic kidney disease    • Chronic kidney disease    • GERD (gastroesophageal reflux disease) 5/14/2019   • H/O  echocardiogram 07/18/2018    LV size normal.  EF 60-65%.  Basal posterior wall hypokinesis.  AV is trileaflet.  Mild MR and TR.     • Hyperlipidemia    • Hypertension    • Peripheral vascular disease (CMS/HCC)    • Renal disorder    • Tobacco abuse        Past Surgical History:  Past Surgical History:   Procedure Laterality Date   • CARDIAC CATHETERIZATION  07/20/2018    Native 3 vessel disease.  Patent stent in the LCx.  native  LAD, non-dominant RCA has .  Patent LIMA to LAD, SVG-OM grafts.     • CARDIAC SURGERY     • CORONARY ANGIOPLASTY WITH STENT PLACEMENT     • CORONARY ANGIOPLASTY WITH STENT PLACEMENT      4/25/16 - SYNERGY stent to the distal left PDA  and distal and mid LIMA to LAD.  .  12/2/15 -  PROMUS stent ot the first obtuse marginal branch via SVG.     • CORONARY ARTERY BYPASS GRAFT  2009   • ILIAC ARTERY STENT  2016    S/P right common iliac artery stent placement and balloon angioplasty with no significant residual stenosis and balloon angioplasty of the left tibioperoneal trunk with no significant residual stenosis.  Performed b Dr. Carlo Grijalva.       Social History:  Social History     Socioeconomic History   • Marital status: Single     Spouse name: Not on file   • Number of children: Not on file   • Years of education: Not on file   • Highest education level: Not on file   Tobacco Use   • Smoking status: Current Every Day Smoker     Packs/day: 0.50     Types: Cigarettes   • Smokeless tobacco: Never Used   Substance and Sexual Activity   • Alcohol use: No     Frequency: Never   • Drug use: No   • Sexual activity: Defer       Allergies:  Allergies   Allergen Reactions   • Contrast Dye Hives   • Ranexa [Ranolazine Er] Unknown (See Comments)     unknown         ROS:  Review of Systems   Constitution: Negative for malaise/fatigue.   HENT: Negative for hearing loss and nosebleeds.    Eyes: Negative for double vision and visual disturbance.   Cardiovascular: Positive for dyspnea on exertion.  "Negative for chest pain, claudication, near-syncope, orthopnea, palpitations, paroxysmal nocturnal dyspnea and syncope.   Respiratory: Negative for cough, shortness of breath, sleep disturbances due to breathing, snoring, sputum production and wheezing.    Endocrine: Negative for cold intolerance, heat intolerance, polydipsia and polyuria.   Hematologic/Lymphatic: Negative for adenopathy and bleeding problem. Does not bruise/bleed easily.   Skin: Negative for flushing and itching.   Musculoskeletal: Negative for back pain, falls, joint pain, joint swelling, muscle weakness and neck pain.   Gastrointestinal: Negative for abdominal pain, dysphagia, heartburn, nausea and vomiting.   Genitourinary: Negative for dysuria and frequency.   Neurological: Negative for disturbances in coordination, dizziness, light-headedness, loss of balance, numbness and weakness.   Psychiatric/Behavioral: Negative for altered mental status and memory loss. The patient is not nervous/anxious.    Allergic/Immunologic: Negative for hives and persistent infections.          Objective:         /79   Pulse 74   Ht 172.7 cm (68\")   Wt 86.4 kg (190 lb 6.4 oz)   BMI 28.95 kg/m²     Physical Exam Not performed    In-Office Procedure(s):  Procedures    ASCVD RIsk Score::  The ASCVD Risk score ([place] DC Jr., et al., 2013) failed to calculate for the following reasons:    Cannot find a previous HDL lab    Cannot find a previous total cholesterol lab        Assessment/Plan:         1. Coronary artery disease of native artery of native heart with stable angina pectoris (CMS/HCC)  Stable currently free of angina    2. Hyperlipidemia LDL goal <70  Continue diet and PCSK9 inhibitor    3. CKD (chronic kidney disease) stage 1, GFR 90 ml/min or greater  Stable    4. Tobacco abuse  Encouraged to discontinue    Mr. Galicia is free of angina and his respiratory status is stable.  I counseled him on the importance of continued risk modification with " control of his blood lipids and discontinuation of tobacco use.  His blood pressure is well controlled.  I encouraged him to advance his activity level as tolerated while following the CDC guidelines for social distancing.  Unable to complete visit using a video connection to the patient. A phone visit was used to complete this visits. Total time of discussion was I spent 8 minutes on telephone visit and 7 minutes to complete electronic medical record documentation.  Yunior Crook MD  05/08/20  .

## 2020-05-18 ENCOUNTER — TELEMEDICINE (OUTPATIENT)
Dept: CARDIOLOGY | Facility: CLINIC | Age: 65
End: 2020-05-18

## 2020-05-18 VITALS
HEIGHT: 68 IN | BODY MASS INDEX: 28.58 KG/M2 | WEIGHT: 188.6 LBS | DIASTOLIC BLOOD PRESSURE: 69 MMHG | SYSTOLIC BLOOD PRESSURE: 120 MMHG | HEART RATE: 96 BPM

## 2020-05-18 DIAGNOSIS — Z72.0 TOBACCO ABUSE: ICD-10-CM

## 2020-05-18 DIAGNOSIS — R42 DIZZINESS AND GIDDINESS: Primary | ICD-10-CM

## 2020-05-18 DIAGNOSIS — E78.5 HYPERLIPIDEMIA LDL GOAL <100: ICD-10-CM

## 2020-05-18 DIAGNOSIS — I25.118 CORONARY ARTERY DISEASE OF NATIVE ARTERY OF NATIVE HEART WITH STABLE ANGINA PECTORIS (HCC): ICD-10-CM

## 2020-05-18 DIAGNOSIS — H53.50 VISUAL COLOR CHANGES: ICD-10-CM

## 2020-05-18 PROCEDURE — 99443 PR PHYS/QHP TELEPHONE EVALUATION 21-30 MIN: CPT | Performed by: INTERNAL MEDICINE

## 2020-05-18 NOTE — PROGRESS NOTES
[facility]    You have chosen to receive care through a telephone visit. Do you consent to use a telephone visit for your medical care today? Yes    Subjective:     Encounter Date:05/18/2020      Patient ID: Rishi Galicia is a 65 y.o. male.      HPI: Rishi Galicia agreed to a telephone clinical visit today secondary to the coronavirus pandemic.  He continues to observe the CDC guidelines for social distancing.  He remains free of fever cough or increased shortness of breath.  He does not report any change in his sense of smell or taste.  Mr. Galicia has a history of coronary disease and currently is free of chest pain pressure or tightness.  He does not report any significant dyspnea on exertion nor does he note orthopnea or PND or lower extremity edema.  He does report an episode last week where he walked from inside his home outside and states the parking lot white lines appeared to be pink visually.  This lasted for about 10 seconds and resolved.  It has not returned.  He does not report any associated syncope or palpitations.  He does report dizziness.  He does not report any change in his medications.  He continues to smoke half a pack of cigarettes daily.      The following portions of the patient's history were reviewed and updated as appropriate: allergies, current medications, past family history, past medical history, past social history, past surgical history and problem list.    Problem List:  Patient Active Problem List   Diagnosis   • Coronary artery disease of native artery of native heart with stable angina pectoris (CMS/[facility])   • CKD (chronic kidney disease) stage 1, GFR 90 ml/min or greater   • Hyperlipidemia LDL goal <100   • Hypothyroidism   • GERD (gastroesophageal reflux disease)   • Tobacco abuse   • Visual color changes       Past Medical History:  Past Medical History:   Diagnosis Date   • CAD (coronary artery disease)    • Chronic kidney disease    • Chronic  kidney disease    • GERD (gastroesophageal reflux disease) 5/14/2019   • H/O echocardiogram 07/18/2018    LV size normal.  EF 60-65%.  Basal posterior wall hypokinesis.  AV is trileaflet.  Mild MR and TR.     • Hyperlipidemia    • Hypertension    • Peripheral vascular disease (CMS/HCC)    • Renal disorder    • Tobacco abuse        Past Surgical History:  Past Surgical History:   Procedure Laterality Date   • CARDIAC CATHETERIZATION  07/20/2018    Native 3 vessel disease.  Patent stent in the LCx.  native  LAD, non-dominant RCA has .  Patent LIMA to LAD, SVG-OM grafts.     • CARDIAC SURGERY     • CORONARY ANGIOPLASTY WITH STENT PLACEMENT     • CORONARY ANGIOPLASTY WITH STENT PLACEMENT      4/25/16 - SYNERGY stent to the distal left PDA  and distal and mid LIMA to LAD.  .  12/2/15 -  PROMUS stent ot the first obtuse marginal branch via SVG.     • CORONARY ARTERY BYPASS GRAFT  2009   • ILIAC ARTERY STENT  2016    S/P right common iliac artery stent placement and balloon angioplasty with no significant residual stenosis and balloon angioplasty of the left tibioperoneal trunk with no significant residual stenosis.  Performed b Dr. Carlo Grijalva.       Social History:  Social History     Socioeconomic History   • Marital status: Single     Spouse name: Not on file   • Number of children: Not on file   • Years of education: Not on file   • Highest education level: Not on file   Tobacco Use   • Smoking status: Current Every Day Smoker     Packs/day: 0.50     Types: Cigarettes   • Smokeless tobacco: Never Used   Substance and Sexual Activity   • Alcohol use: No     Frequency: Never   • Drug use: No   • Sexual activity: Defer       Allergies:  Allergies   Allergen Reactions   • Contrast Dye Hives   • Ranexa [Ranolazine Er] Unknown (See Comments)     unknown         ROS:  Review of Systems   Constitution: Negative for malaise/fatigue.   HENT: Negative for hearing loss and nosebleeds.    Eyes: Positive for visual  "disturbance. Negative for double vision.   Cardiovascular: Negative for chest pain, claudication, dyspnea on exertion, near-syncope, orthopnea, palpitations, paroxysmal nocturnal dyspnea and syncope.   Respiratory: Negative for cough, shortness of breath, sleep disturbances due to breathing, snoring, sputum production and wheezing.    Endocrine: Negative for cold intolerance, heat intolerance, polydipsia and polyuria.   Hematologic/Lymphatic: Negative for adenopathy and bleeding problem. Does not bruise/bleed easily.   Skin: Negative for flushing and itching.   Musculoskeletal: Negative for back pain, falls, joint pain, joint swelling, muscle weakness and neck pain.   Gastrointestinal: Negative for abdominal pain, dysphagia, heartburn, nausea and vomiting.   Genitourinary: Negative for dysuria and frequency.   Neurological: Negative for disturbances in coordination, dizziness, light-headedness, loss of balance, numbness and weakness.   Psychiatric/Behavioral: Negative for altered mental status and memory loss. The patient is not nervous/anxious.    Allergic/Immunologic: Negative for hives and persistent infections.          Objective:         /69   Pulse 96   Ht 172.7 cm (68\")   Wt 85.5 kg (188 lb 9.6 oz)   BMI 28.68 kg/m²     Physical Exam    In-Office Procedure(s):  Procedures not performed    ASCVD RIsk Score::  The ASCVD Risk score (Maximilian CORBIN Jr., et al., 2013) failed to calculate for the following reasons:    Cannot find a previous HDL lab    Cannot find a previous total cholesterol lab        Assessment/Plan:         1. Dizziness and giddiness  Unclear etiology  - Holter Monitor - 72 Hour Up To 21 Days; Future    2. Visual color changes  Unclear etiology    3. Coronary artery disease of native artery of native heart with stable angina pectoris (CMS/HCC)  Stable    4. Hyperlipidemia LDL goal <70  Continue low-cholesterol low saturated fat diet and PCSK9 inhibitor    5. Tobacco abuse  Encouraged to " discontinue    Mr. Galicia reports a stable activity level as he observes the CDC guidelines for social distancing.  He is free of angina.  Unfortunately continues to smoke 1/2 pack of cigarettes daily.  We have counseled him on the importance of risk modification by observing a low-cholesterol low saturated fat diet and discontinuing tobacco abuse.  We have encouraged him to follow his medical regimen.  His recent episode of visual change and dizziness is unclear.  We will equip him with a 2-week ambulatory EKG monitor.  He will monitor his symptoms and contact us if they return.  We will arrange for follow-up in 4 weeks.  I spent 15 minutes on telephone visit 12 minutes completing electronic medical record documentation           Yunior Crook MD  05/18/20  .

## 2020-06-15 RX ORDER — METOPROLOL SUCCINATE 25 MG/1
25 TABLET, EXTENDED RELEASE ORAL DAILY
Qty: 30 TABLET | Refills: 5 | Status: SHIPPED | OUTPATIENT
Start: 2020-06-15 | End: 2020-06-26 | Stop reason: DRUGHIGH

## 2020-06-26 ENCOUNTER — OFFICE VISIT (OUTPATIENT)
Dept: CARDIOLOGY | Facility: CLINIC | Age: 65
End: 2020-06-26

## 2020-06-26 VITALS
HEART RATE: 74 BPM | WEIGHT: 191 LBS | SYSTOLIC BLOOD PRESSURE: 104 MMHG | HEIGHT: 68 IN | BODY MASS INDEX: 28.95 KG/M2 | DIASTOLIC BLOOD PRESSURE: 70 MMHG

## 2020-06-26 DIAGNOSIS — E78.5 HYPERLIPIDEMIA LDL GOAL <100: ICD-10-CM

## 2020-06-26 DIAGNOSIS — I25.118 CORONARY ARTERY DISEASE OF NATIVE ARTERY OF NATIVE HEART WITH STABLE ANGINA PECTORIS (HCC): Primary | ICD-10-CM

## 2020-06-26 DIAGNOSIS — Z72.0 TOBACCO ABUSE: ICD-10-CM

## 2020-06-26 DIAGNOSIS — N18.1 CKD (CHRONIC KIDNEY DISEASE) STAGE 1, GFR 90 ML/MIN OR GREATER: ICD-10-CM

## 2020-06-26 DIAGNOSIS — R42 ORTHOSTATIC DIZZINESS: ICD-10-CM

## 2020-06-26 DIAGNOSIS — R53.83 OTHER FATIGUE: ICD-10-CM

## 2020-06-26 PROCEDURE — 99214 OFFICE O/P EST MOD 30 MIN: CPT | Performed by: INTERNAL MEDICINE

## 2020-06-26 RX ORDER — ISOSORBIDE MONONITRATE 60 MG/1
90 TABLET, EXTENDED RELEASE ORAL DAILY
COMMUNITY
End: 2021-10-21 | Stop reason: SDUPTHER

## 2020-06-26 NOTE — PROGRESS NOTES
[facility]        Subjective:     Encounter Date:06/26/2020      Patient ID: Rishi Galicia is a 65 y.o. male.      HPI: I saw Rishi Galicia today for cardiovascular care.  He is a very pleasant 65-year-old male who is observing the CDC guidelines for social distancing.  He remains free of fever cough or increased shortness of breath.  He does not report any change in his sense of smell or taste.  Mr. Galicia has a history of coronary heart disease.  He reports very rare chest discomfort.  He does report increased fatigue and mild orthostatic dizziness since we initiated metoprolol succinate 25 mg daily.  He reported palpitations and a ambulatory EKG monitor revealed underlying sinus rhythm with rare isolated ventricular and supraventricular ectopy.  He had one episode of nonsustained ventricular tachycardia and 5 episodes of PSVT.  Beta-blocker therapy was instituted to control palpitations.  Currently he denies orthopnea or PND no syncope and no palpitations.  Unfortunately continues to smoke at 1 pack of cigarettes per day      The following portions of the patient's history were reviewed and updated as appropriate: allergies, current medications, past family history, past medical history, past social history, past surgical history and problem list.    Problem List:  Patient Active Problem List   Diagnosis   • Coronary artery disease of native artery of native heart with stable angina pectoris (CMS/[facility])   • CKD (chronic kidney disease) stage 1, GFR 90 ml/min or greater   • Hyperlipidemia LDL goal <100   • Hypothyroidism   • GERD (gastroesophageal reflux disease)   • Tobacco abuse   • Visual color changes   • Other fatigue   • Orthostatic dizziness       Past Medical History:  Past Medical History:   Diagnosis Date   • CAD (coronary artery disease)    • Chronic kidney disease    • Chronic kidney disease    • GERD (gastroesophageal reflux disease) 5/14/2019   • H/O echocardiogram  07/18/2018    LV size normal.  EF 60-65%.  Basal posterior wall hypokinesis.  AV is trileaflet.  Mild MR and TR.     • Hyperlipidemia    • Hypertension    • Peripheral vascular disease (CMS/HCC)    • Renal disorder    • Tobacco abuse        Past Surgical History:  Past Surgical History:   Procedure Laterality Date   • CARDIAC CATHETERIZATION  07/20/2018    Native 3 vessel disease.  Patent stent in the LCx.  native  LAD, non-dominant RCA has .  Patent LIMA to LAD, SVG-OM grafts.     • CARDIAC SURGERY     • CORONARY ANGIOPLASTY WITH STENT PLACEMENT     • CORONARY ANGIOPLASTY WITH STENT PLACEMENT      4/25/16 - SYNERGY stent to the distal left PDA  and distal and mid LIMA to LAD.  .  12/2/15 -  PROMUS stent ot the first obtuse marginal branch via SVG.     • CORONARY ARTERY BYPASS GRAFT  2009   • ILIAC ARTERY STENT  2016    S/P right common iliac artery stent placement and balloon angioplasty with no significant residual stenosis and balloon angioplasty of the left tibioperoneal trunk with no significant residual stenosis.  Performed b Dr. Carlo Grijalva.       Social History:  Social History     Socioeconomic History   • Marital status: Single     Spouse name: Not on file   • Number of children: Not on file   • Years of education: Not on file   • Highest education level: Not on file   Tobacco Use   • Smoking status: Current Every Day Smoker     Packs/day: 0.50     Types: Cigarettes   • Smokeless tobacco: Never Used   Substance and Sexual Activity   • Alcohol use: No     Frequency: Never   • Drug use: No   • Sexual activity: Defer       Allergies:  Allergies   Allergen Reactions   • Contrast Dye Hives   • Ranexa [Ranolazine Er] Unknown (See Comments)     Doesn't remember         ROS:  Review of Systems   Constitution: Positive for malaise/fatigue.   HENT: Negative for hearing loss and nosebleeds.    Eyes: Negative for double vision and visual disturbance.   Cardiovascular: Negative for chest pain, claudication,  "dyspnea on exertion, near-syncope, orthopnea, palpitations, paroxysmal nocturnal dyspnea and syncope.        Orthostatic dizziness   Respiratory: Negative for cough, shortness of breath, sleep disturbances due to breathing, snoring, sputum production and wheezing.    Endocrine: Negative for cold intolerance, heat intolerance, polydipsia and polyuria.   Hematologic/Lymphatic: Negative for adenopathy and bleeding problem. Does not bruise/bleed easily.   Skin: Negative for flushing and itching.   Musculoskeletal: Negative for back pain, falls, joint pain, joint swelling, muscle weakness and neck pain.   Gastrointestinal: Negative for abdominal pain, dysphagia, heartburn, nausea and vomiting.   Genitourinary: Negative for dysuria and frequency.   Neurological: Negative for disturbances in coordination, dizziness, light-headedness, loss of balance, numbness and weakness.   Psychiatric/Behavioral: Negative for altered mental status and memory loss. The patient is not nervous/anxious.    Allergic/Immunologic: Negative for hives and persistent infections.          Objective:         /70   Pulse 74   Ht 172.7 cm (68\")   Wt 86.6 kg (191 lb)   BMI 29.04 kg/m²     Physical Exam   Constitutional: He is oriented to person, place, and time. He appears well-developed and well-nourished.   HENT:   Head: Normocephalic and atraumatic.   Eyes: Pupils are equal, round, and reactive to light. Conjunctivae and EOM are normal.   Neck: Neck supple. No thyromegaly present.   Cardiovascular: Normal rate, regular rhythm, S1 normal, S2 normal and intact distal pulses. PMI is not displaced. Exam reveals gallop and S4. Exam reveals no S3, no distant heart sounds, no friction rub, no midsystolic click and no opening snap.   No murmur heard.  Pulses:       Carotid pulses are 2+ on the right side, and 2+ on the left side.       Radial pulses are 2+ on the right side, and 2+ on the left side.        Femoral pulses are 2+ on the right side, " and 2+ on the left side.       Dorsalis pedis pulses are 2+ on the right side, and 2+ on the left side.        Posterior tibial pulses are 2+ on the right side, and 2+ on the left side.   Pulmonary/Chest: Effort normal and breath sounds normal. No respiratory distress. He has no wheezes. He has no rales.   Abdominal: Soft. Bowel sounds are normal. He exhibits no distension and no mass. There is no tenderness.   Musculoskeletal: Normal range of motion. He exhibits no edema.   Neurological: He is alert and oriented to person, place, and time.   Skin: Skin is warm and dry. No erythema.   Psychiatric: He has a normal mood and affect.       In-Office Procedure(s):  Procedures    ASCVD RIsk Score::  The ASCVD Risk score (Maximilianamee ALANIZ Jr., et al., 2013) failed to calculate for the following reasons:    Cannot find a previous HDL lab    Cannot find a previous total cholesterol lab        Assessment/Plan:         1. Coronary artery disease of native artery of native heart with stable angina pectoris (CMS/HCC)  Stable angina    2. Hyperlipidemia LDL goal <70  Low-cholesterol low saturated fat diet continue PCSK9 inhibitor    3. Tobacco abuse  Encouraged to decrease and discontinue    4. CKD (chronic kidney disease) stage 1, GFR 90 ml/min or greater      5. Other fatigue  Suspect secondary to beta-blocker    6. Orthostatic dizziness  Secondary to medications    Mr. Galicia reports stable angina and is euvolemic.  I counseled him of observing a low-cholesterol low saturated fat diet and following his medical regimen.  We discussed at length the importance of smoking cessation.  I will reduce his beta-blocker therapy.  He is on metoprolol succinate 25 mg daily we will cut that in half to 12.5 mg daily.  Hopefully his fatigue and orthostatic symptoms will resolve and his palpitations will remain controlled.  If not I have asked him to contact me.  Will otherwise see him in follow-up in 4 months.       Yunior Crook,  MD  06/26/20  .

## 2020-06-26 NOTE — PROGRESS NOTES
"[facility]        Subjective:     Encounter Date:06/26/2020      Patient ID: Rishi Galicia is a 65 y.o. male.      HPI:      The following portions of the patient's history were reviewed and updated as appropriate: {history reviewed:20406::\"allergies\",\"current medications\",\"past family history\",\"past medical history\",\"past social history\",\"past surgical history\",\"problem list\"}.    Problem List:  Patient Active Problem List   Diagnosis   • Coronary artery disease of native artery of native heart with stable angina pectoris (CMS/[facility])   • CKD (chronic kidney disease) stage 1, GFR 90 ml/min or greater   • Hyperlipidemia LDL goal <100   • Hypothyroidism   • GERD (gastroesophageal reflux disease)   • Tobacco abuse   • Visual color changes       Past Medical History:  Past Medical History:   Diagnosis Date   • CAD (coronary artery disease)    • Chronic kidney disease    • Chronic kidney disease    • GERD (gastroesophageal reflux disease) 5/14/2019   • H/O echocardiogram 07/18/2018    LV size normal.  EF 60-65%.  Basal posterior wall hypokinesis.  AV is trileaflet.  Mild MR and TR.     • Hyperlipidemia    • Hypertension    • Peripheral vascular disease (CMS/[facility])    • Renal disorder    • Tobacco abuse        Past Surgical History:  Past Surgical History:   Procedure Laterality Date   • CARDIAC CATHETERIZATION  07/20/2018    Native 3 vessel disease.  Patent stent in the LCx.  native  LAD, non-dominant RCA has .  Patent LIMA to LAD, SVG-OM grafts.     • CARDIAC SURGERY     • CORONARY ANGIOPLASTY WITH STENT PLACEMENT     • CORONARY ANGIOPLASTY WITH STENT PLACEMENT      4/25/16 - SYNERGY stent to the distal left PDA  and distal and mid LIMA to LAD.  .  12/2/15 -  PROMUS stent ot the first obtuse marginal branch via SVG.     • CORONARY ARTERY BYPASS GRAFT  2009   • ILIAC ARTERY STENT  2016    S/P right common iliac artery stent placement and balloon angioplasty with no significant residual stenosis " "and balloon angioplasty of the left tibioperoneal trunk with no significant residual stenosis.  Performed b Dr. Carlo Grijalva.       Social History:  Social History     Socioeconomic History   • Marital status: Single     Spouse name: Not on file   • Number of children: Not on file   • Years of education: Not on file   • Highest education level: Not on file   Tobacco Use   • Smoking status: Current Every Day Smoker     Packs/day: 0.50     Types: Cigarettes   • Smokeless tobacco: Never Used   Substance and Sexual Activity   • Alcohol use: No     Frequency: Never   • Drug use: No   • Sexual activity: Defer       Allergies:  Allergies   Allergen Reactions   • Contrast Dye Hives   • Ranexa [Ranolazine Er] Unknown (See Comments)     Doesn't remember         ROS:  ROS       Objective:         /70   Pulse 74   Ht 172.7 cm (68\")   Wt 86.6 kg (191 lb)   BMI 29.04 kg/m²     Physical Exam    In-Office Procedure(s):  Procedures    ASCVD RIsk Score::  The ASCVD Risk score (Maximilian DC Jr., et al., 2013) failed to calculate for the following reasons:    Cannot find a previous HDL lab    Cannot find a previous total cholesterol lab        Assessment/Plan:         There are no diagnoses linked to this encounter.         Yunior Crook MD  06/26/20  .    "

## 2020-07-06 RX ORDER — AMLODIPINE BESYLATE 5 MG/1
TABLET ORAL
Qty: 45 TABLET | Refills: 1 | Status: SHIPPED | OUTPATIENT
Start: 2020-07-06 | End: 2021-01-21

## 2020-08-14 ENCOUNTER — OFFICE VISIT (OUTPATIENT)
Dept: CARDIOLOGY | Facility: CLINIC | Age: 65
End: 2020-08-14

## 2020-08-14 VITALS
DIASTOLIC BLOOD PRESSURE: 70 MMHG | WEIGHT: 192 LBS | HEIGHT: 68 IN | RESPIRATION RATE: 16 BRPM | SYSTOLIC BLOOD PRESSURE: 112 MMHG | TEMPERATURE: 98 F | BODY MASS INDEX: 29.1 KG/M2 | HEART RATE: 80 BPM

## 2020-08-14 DIAGNOSIS — G47.10 HYPERSOMNIA, UNSPECIFIED: ICD-10-CM

## 2020-08-14 DIAGNOSIS — Z72.0 TOBACCO ABUSE: ICD-10-CM

## 2020-08-14 DIAGNOSIS — R53.82 CHRONIC FATIGUE: ICD-10-CM

## 2020-08-14 DIAGNOSIS — E78.5 HYPERLIPIDEMIA LDL GOAL <100: ICD-10-CM

## 2020-08-14 DIAGNOSIS — R40.0 DAYTIME SOMNOLENCE: ICD-10-CM

## 2020-08-14 DIAGNOSIS — I25.118 CORONARY ARTERY DISEASE OF NATIVE ARTERY OF NATIVE HEART WITH STABLE ANGINA PECTORIS (HCC): Primary | ICD-10-CM

## 2020-08-14 DIAGNOSIS — I10 ESSENTIAL HYPERTENSION: ICD-10-CM

## 2020-08-14 PROCEDURE — 99214 OFFICE O/P EST MOD 30 MIN: CPT | Performed by: INTERNAL MEDICINE

## 2020-08-14 NOTE — PROGRESS NOTES
[facility]        Subjective:     Encounter Date:08/14/2020      Patient ID: Rishi Galicia is a 65 y.o. male.      HPI: I saw Rishi Galicia today for cardiovascular care.  He is a pleasant 65-year-old male who remains free of fever cough or increased shortness of breath.  He does not report any change in his sense of smell or taste.  He is observing the CDC guidelines for social distancing.  Mr. Galicia has known coronary heart disease.  He currently is free of any chest pain pressure or tightness.  He does not report any progressive dyspnea on exertion and denies orthopnea or PND there is no syncope near syncope or palpitation.  Mr. Galicia does report generalized fatigue and feeling rundown.  He reports daytime somnolence.  He questions whether his beta-blocker therapy could contribute to his daytime fatigue somnolence and feeling rundown.  He is on metoprolol succinate 12 and half milligrams once daily.  His blood pressure is well controlled.  He has a history of dyslipidemia and remains on PCSK9 inhibitor and a low-cholesterol low saturated fat diet.  His lipids are monitored by Dr. Quang Plasencia, his primary care physician.  Mr. Galicia continues tobacco use at 1 pack/day.      The following portions of the patient's history were reviewed and updated as appropriate: allergies, current medications, past family history, past medical history, past social history, past surgical history and problem list.    Problem List:  Patient Active Problem List   Diagnosis   • Coronary artery disease of native artery of native heart with stable angina pectoris (CMS/[facility])   • CKD (chronic kidney disease) stage 1, GFR 90 ml/min or greater   • Hyperlipidemia LDL goal <100   • Hypothyroidism   • GERD (gastroesophageal reflux disease)   • Tobacco abuse   • Visual color changes   • Fatigue   • Orthostatic dizziness   • Daytime somnolence   • Essential hypertension       Past Medical History:  Past  Medical History:   Diagnosis Date   • CAD (coronary artery disease)    • Chronic kidney disease    • Chronic kidney disease    • GERD (gastroesophageal reflux disease) 5/14/2019   • H/O echocardiogram 07/18/2018    LV size normal.  EF 60-65%.  Basal posterior wall hypokinesis.  AV is trileaflet.  Mild MR and TR.     • Hyperlipidemia    • Hypertension    • Peripheral vascular disease (CMS/HCC)    • Renal disorder    • Tobacco abuse        Past Surgical History:  Past Surgical History:   Procedure Laterality Date   • CARDIAC CATHETERIZATION  07/20/2018    Native 3 vessel disease.  Patent stent in the LCx.  native  LAD, non-dominant RCA has .  Patent LIMA to LAD, SVG-OM grafts.     • CARDIAC SURGERY     • CORONARY ANGIOPLASTY WITH STENT PLACEMENT     • CORONARY ANGIOPLASTY WITH STENT PLACEMENT      4/25/16 - SYNERGY stent to the distal left PDA  and distal and mid LIMA to LAD.  .  12/2/15 -  PROMUS stent ot the first obtuse marginal branch via SVG.     • CORONARY ARTERY BYPASS GRAFT  2009   • ILIAC ARTERY STENT  2016    S/P right common iliac artery stent placement and balloon angioplasty with no significant residual stenosis and balloon angioplasty of the left tibioperoneal trunk with no significant residual stenosis.  Performed b Dr. Carlo Grijalva.       Social History:  Social History     Socioeconomic History   • Marital status: Single     Spouse name: Not on file   • Number of children: Not on file   • Years of education: Not on file   • Highest education level: Not on file   Tobacco Use   • Smoking status: Current Every Day Smoker     Packs/day: 0.50     Types: Cigarettes   • Smokeless tobacco: Never Used   Substance and Sexual Activity   • Alcohol use: No     Frequency: Never   • Drug use: No   • Sexual activity: Defer       Allergies:  Allergies   Allergen Reactions   • Contrast Dye Hives   • Ranexa [Ranolazine Er] Unknown (See Comments)     Doesn't remember         ROS:  Review of Systems   Constitution:  "Positive for malaise/fatigue.   HENT: Negative for hearing loss and nosebleeds.    Eyes: Negative for double vision and visual disturbance.   Cardiovascular: Negative for chest pain, claudication, dyspnea on exertion, near-syncope, orthopnea, palpitations, paroxysmal nocturnal dyspnea and syncope.   Respiratory: Negative for cough, shortness of breath, sleep disturbances due to breathing, snoring, sputum production and wheezing.    Endocrine: Negative for cold intolerance, heat intolerance, polydipsia and polyuria.   Hematologic/Lymphatic: Negative for adenopathy and bleeding problem. Does not bruise/bleed easily.   Skin: Negative for flushing and itching.   Musculoskeletal: Negative for back pain, falls, joint pain, joint swelling, muscle weakness and neck pain.   Gastrointestinal: Negative for abdominal pain, dysphagia, heartburn, nausea and vomiting.   Genitourinary: Negative for dysuria and frequency.   Neurological: Positive for excessive daytime sleepiness. Negative for disturbances in coordination, dizziness, light-headedness, loss of balance, numbness and weakness.   Psychiatric/Behavioral: Negative for altered mental status and memory loss. The patient is not nervous/anxious.    Allergic/Immunologic: Negative for hives and persistent infections.          Objective:         /70   Pulse 80   Temp 98 °F (36.7 °C)   Resp 16   Ht 172.7 cm (68\")   Wt 87.1 kg (192 lb)   BMI 29.19 kg/m²     Physical Exam   Constitutional: He is oriented to person, place, and time. He appears well-developed and well-nourished.   HENT:   Head: Normocephalic and atraumatic.   Eyes: Pupils are equal, round, and reactive to light. Conjunctivae and EOM are normal.   Neck: Neck supple. No thyromegaly present.   Cardiovascular: Normal rate, regular rhythm, S1 normal, S2 normal and intact distal pulses. PMI is not displaced. Exam reveals gallop and S4. Exam reveals no S3, no distant heart sounds, no friction rub, no midsystolic " click and no opening snap.   No murmur heard.  Pulses:       Carotid pulses are 2+ on the right side, and 2+ on the left side.       Radial pulses are 2+ on the right side, and 2+ on the left side.        Femoral pulses are 2+ on the right side, and 2+ on the left side.       Dorsalis pedis pulses are 2+ on the right side, and 2+ on the left side.        Posterior tibial pulses are 2+ on the right side, and 2+ on the left side.   Pulmonary/Chest: Effort normal and breath sounds normal. No respiratory distress. He has no wheezes. He has no rales.   Abdominal: Soft. Bowel sounds are normal. He exhibits no distension and no mass. There is no tenderness.   Musculoskeletal: Normal range of motion. He exhibits no edema.   Neurological: He is alert and oriented to person, place, and time.   Skin: Skin is warm and dry. No erythema.   Psychiatric: He has a normal mood and affect.       In-Office Procedure(s):  Procedures    ASCVD RIsk Score::  The ASCVD Risk score (Maximilian CORBIN Jr., et al., 2013) failed to calculate for the following reasons:    Cannot find a previous HDL lab    Cannot find a previous total cholesterol lab        Assessment/Plan:         1. Coronary artery disease of native artery of native heart with stable angina pectoris (CMS/HCC)  Stable    2. Essential hypertension  Controlled    3. Hyperlipidemia LDL goal <70  Controlled    4. Tobacco abuse  Encourage discontinuation    5. Daytime somnolence  Possible sleep apnea    6. Chronic fatigue  Possible sleep apnea and/or medication    Mr. Galicia does not report chest discomfort progressive respiratory insufficiency.  He is euvolemic on physical exam.  We will discontinue his metoprolol succinate 12.5 mg daily to assess whether it is contributing to his fatigue.  In view of his daytime somnolence and generalized fatigue I will obtain a sleep study.  Have counseled Rishi on the importance of discontinuing tobacco use and continue to restrict salt and follow a  low-cholesterol low saturated fat diet.  Encourage him to increase his aerobic activity while observing the CDC guidelines for social distancing.  I will tentatively see him in follow-up in 3 months sooner if needed.       Yunior Crook MD  08/14/20  .

## 2020-09-02 ENCOUNTER — APPOINTMENT (OUTPATIENT)
Dept: SLEEP MEDICINE | Facility: HOSPITAL | Age: 65
End: 2020-09-02

## 2020-10-07 DIAGNOSIS — K21.9 GASTROESOPHAGEAL REFLUX DISEASE WITHOUT ESOPHAGITIS: ICD-10-CM

## 2020-10-07 DIAGNOSIS — I25.118 CORONARY ARTERY DISEASE OF NATIVE ARTERY OF NATIVE HEART WITH STABLE ANGINA PECTORIS (HCC): ICD-10-CM

## 2020-10-07 RX ORDER — CLOPIDOGREL BISULFATE 75 MG/1
75 TABLET ORAL DAILY
Qty: 90 TABLET | Refills: 1 | Status: SHIPPED | OUTPATIENT
Start: 2020-10-07 | End: 2020-11-04 | Stop reason: HOSPADM

## 2020-10-07 RX ORDER — PANTOPRAZOLE SODIUM 40 MG/1
40 TABLET, DELAYED RELEASE ORAL DAILY
Qty: 90 TABLET | Refills: 1 | Status: SHIPPED | OUTPATIENT
Start: 2020-10-07 | End: 2021-04-27

## 2020-10-14 ENCOUNTER — TELEPHONE (OUTPATIENT)
Dept: CARDIOLOGY | Facility: CLINIC | Age: 65
End: 2020-10-14

## 2020-10-14 NOTE — TELEPHONE ENCOUNTER
CNA PT    PT has apt with CNA on 11/13/20 but wants in sooner. He complains of pain in the center of his chest on Monday night but since has gone away. No fever but he is very tired and is sleeping a lot. PT does not want to go to ER, wants to know since CNA is out of office can someone else see him?

## 2020-10-15 ENCOUNTER — OFFICE VISIT (OUTPATIENT)
Dept: CARDIOLOGY | Facility: CLINIC | Age: 65
End: 2020-10-15

## 2020-10-15 VITALS
BODY MASS INDEX: 28.76 KG/M2 | SYSTOLIC BLOOD PRESSURE: 104 MMHG | HEART RATE: 90 BPM | DIASTOLIC BLOOD PRESSURE: 70 MMHG | RESPIRATION RATE: 18 BRPM | WEIGHT: 189.8 LBS | HEIGHT: 68 IN

## 2020-10-15 DIAGNOSIS — R07.89 CHEST PAIN, ATYPICAL: Primary | ICD-10-CM

## 2020-10-15 DIAGNOSIS — I25.118 CORONARY ARTERY DISEASE OF NATIVE ARTERY OF NATIVE HEART WITH STABLE ANGINA PECTORIS (HCC): ICD-10-CM

## 2020-10-15 PROCEDURE — 93000 ELECTROCARDIOGRAM COMPLETE: CPT | Performed by: INTERNAL MEDICINE

## 2020-10-15 PROCEDURE — 99214 OFFICE O/P EST MOD 30 MIN: CPT | Performed by: INTERNAL MEDICINE

## 2020-10-15 NOTE — PROGRESS NOTES
Cardiology clinic note  Pierre Kang MD, PhD  Subjective:     Encounter Date:10/15/2020      Patient ID: Rishi Galicia is a 65 y.o. male.    Chief Complaint:  Chief Complaint   Patient presents with   • Chest Pain       HPI:  History of Present Illness  I the pleasure to see this very pleasant 65-year-old male today who is managed with respect to his cardiac care by Dr. Yunior Crook and he presents after an episode of atypical chest pain that was substernal in location without radiation diaphoresis presyncope palpitations or other complaints.  He has a known history of significant coronary disease with multivessel bypass in the past as well as multiple stents after his bypass surgery.  His last cardiac catheterization demonstrated patent LIMA to LAD as well as SVG to OM1 and he had stents placed to the mid to distal LAD via LIMA as well as to the obtuse marginal via the SVG.  His last stress test was in 2019 over a year ago with an EF of greater than 70% with normal myocardial perfusion and no evidence of ischemia with a low risk study.  He says this was atypical for his normal chest pain associated with typical angina as this is more left-sided which prompted ischemic evaluation and prior stent placement.  He said pain was a 6-7 out of 10 lasting about 20 minutes resolving spontaneously.  He did not go to the hospital because he does not like hospitals otherwise he is very compliant with all of his cardiac medications including dual antiplatelet therapy, statin therapy although he is not presently on beta-blocker secondary to severe fatigue.  In May he had a Holter monitor which demonstrated nonsustained VT 5 beats only with paroxysmal SVT and paroxysmal atrial tachycardia which were again self-limited with no symptoms.  His heart rate is 90 today with sinus rhythm.  EKG demonstrates nonspecific findings in the lateral leads with very subtle J-point depression but otherwise ST 80 is normal.  The patient is  chest pain-free over the last 2 to 3 days and he has been walking without any exertional angina since Monday when this episode occurred.  Has not been awakened from sleep by any of these episodes and he has no other complaints.  No recent fever chills sweats nausea vomiting diarrhea.  No other heart failure signs or symptoms edema PND orthopnea expressed today.    Review of systems x14 point review of systems is negative except was mentioned above    Historical data copied forward and previous encounters and EMR is unchanged    The following portions of the patient's history were reviewed and updated as appropriate: allergies, current medications, past family history, past medical history, past social history, past surgical history and problem list.    Problem List:  Patient Active Problem List   Diagnosis   • Coronary artery disease of native artery of native heart with stable angina pectoris (CMS/HCC)   • CKD (chronic kidney disease) stage 1, GFR 90 ml/min or greater   • Hyperlipidemia LDL goal <100   • Hypothyroidism   • GERD (gastroesophageal reflux disease)   • Tobacco abuse   • Visual color changes   • Fatigue   • Orthostatic dizziness   • Daytime somnolence   • Essential hypertension       Past Medical History:  Past Medical History:   Diagnosis Date   • CAD (coronary artery disease)    • Chronic kidney disease    • Chronic kidney disease    • GERD (gastroesophageal reflux disease) 5/14/2019   • H/O echocardiogram 07/18/2018    LV size normal.  EF 60-65%.  Basal posterior wall hypokinesis.  AV is trileaflet.  Mild MR and TR.     • Hyperlipidemia    • Hypertension    • Peripheral vascular disease (CMS/HCC)    • Renal disorder    • Tobacco abuse        Past Surgical History:  Past Surgical History:   Procedure Laterality Date   • CARDIAC CATHETERIZATION  07/20/2018    Native 3 vessel disease.  Patent stent in the LCx.  native  LAD, non-dominant RCA has .  Patent LIMA to LAD, SVG-OM grafts.     • CARDIAC  "SURGERY     • CORONARY ANGIOPLASTY WITH STENT PLACEMENT     • CORONARY ANGIOPLASTY WITH STENT PLACEMENT      4/25/16 - SYNERGY stent to the distal left PDA  and distal and mid LIMA to LAD.  .  12/2/15 -  PROMUS stent ot the first obtuse marginal branch via SVG.     • CORONARY ARTERY BYPASS GRAFT  2009   • ILIAC ARTERY STENT  2016    S/P right common iliac artery stent placement and balloon angioplasty with no significant residual stenosis and balloon angioplasty of the left tibioperoneal trunk with no significant residual stenosis.  Performed b Dr. Carlo Grijalva.       Social History:  Social History     Socioeconomic History   • Marital status: Single     Spouse name: Not on file   • Number of children: Not on file   • Years of education: Not on file   • Highest education level: Not on file   Tobacco Use   • Smoking status: Current Every Day Smoker     Packs/day: 0.50     Types: Cigarettes   • Smokeless tobacco: Never Used   Substance and Sexual Activity   • Alcohol use: No     Frequency: Never   • Drug use: No   • Sexual activity: Defer       Allergies:  Allergies   Allergen Reactions   • Contrast Dye Hives   • Ranexa [Ranolazine Er] Unknown (See Comments)     Doesn't remember       Immunizations:    There is no immunization history on file for this patient.    ROS:  ROS       Objective:         /70 (BP Location: Left arm, Patient Position: Sitting)   Pulse 90   Resp 18   Ht 172.7 cm (68\")   Wt 86.1 kg (189 lb 12.8 oz)   BMI 28.86 kg/m²     Physical Exam  Normocephalic atraumatic pupils equal round extraocular movements intact bilaterally  Trachea midline neck supple no carotid bruits no JVD  Regular rate and rhythm 90s with no rubs murmurs or gallops no heave no lift  Clear to auscultation bilaterally  No clubbing cyanosis or edema  Skin is warm and dry  Neurologically grossly intact bilaterally  No bony abnormalities grossly  Normal mood and affect today  Pulses 2+ peripheral bilateral " radials  In-Office Procedure(s):    ECG 12 Lead    Date/Time: 10/15/2020 1:57 PM  Performed by: Pierre Kang MD  Authorized by: Pierre Kang MD   Rhythm: sinus rhythm  Rate: normal  Conduction: conduction normal  QRS axis: normal  Other findings: non-specific ST-T wave changes    Clinical impression: non-specific ECG  Comments: Normal sinus rhythm  Normal QRS axis as well as intervals  No significant LVH seen by criteria  Borderline nonspecific findings in lateral leads of 1 aVL and V5 and V6, mild J-point depression but ST 80 is normal  No Q waves identified  Nonspecific ECG            ASCVD RIsk Score::  The ASCVD Risk score (Maximilian CORBIN Jr., et al., 2013) failed to calculate for the following reasons:    Cannot find a previous HDL lab    Cannot find a previous total cholesterol lab    Recent Radiology:  Imaging Results (Most Recent)     None          Lab Review:   No visits with results within 6 Month(s) from this visit.   Latest known visit with results is:   Hospital Outpatient Visit on 06/04/2019   Component Date Value   •  CV STRESS PROTOCOL 1 06/04/2019 Pharmacologic    • Stage 1 06/04/2019 1    • Duration Min Stage 1 06/04/2019 0    • Duration Sec Stage 1 06/04/2019 10    • Stress Dose Regadenoson * 06/04/2019 0.4    • Stress Comments Stage 1 06/04/2019 10 sec bolus injection    • Baseline HR 06/04/2019 64    • Baseline BP 06/04/2019 142/79    • Peak HR 06/04/2019 97    • Percent Max Pred HR 06/04/2019 62.58    • Percent Target HR 06/04/2019 74    • Peak BP 06/04/2019 142/79    • Recovery HR 06/04/2019 80    • Recovery BP 06/04/2019 130/67    • Target HR (85%) 06/04/2019 132    • Max. Pred. HR (100%) 06/04/2019 155    • Nuc Stress EF 06/04/2019 74                 Assessment:     Native and bypass graft CAD, atypical chest pain  Status post remote bypass surgery as well as multivessel stenting in the past  Last known ischemic evaluation with stress testing last year with normal  myocardial perfusion seen by Lexiscan  Will repeat 2D echo for structural and functional evaluation and wall motion analysis  We will repeat Lexiscan as we have a recent comparison within the last 18 months  Atypical symptoms more  Continue dual antiplatelet therapy as well as high intensity statin therapy per guidelines  He can follow-up with Dr. Yunior Crook in 2 weeks in the clinic for review of his stress testing and noninvasive studies  He will go to the hospital for any recurrent chest pain and we will perform diagnostic angiography given underlying CKD stage III to limit contrast exposure with possible staged PCI if needed at that time, otherwise he can follow-up with Dr. Crook and make invasive ischemic evaluation if needed depending on recurrence of his symptoms and findings from risk stratification during noninvasive stress    Is a pleasure to be involved in his cardiovascular care.  Please call with any questions or concerns  Pierre Kang MD, PhD    New patient to me with new problems above    Level of Care:                 Pierre Kang MD  10/15/20  .

## 2020-10-23 ENCOUNTER — TELEMEDICINE (OUTPATIENT)
Dept: CARDIOLOGY | Facility: CLINIC | Age: 65
End: 2020-10-23

## 2020-10-23 VITALS
SYSTOLIC BLOOD PRESSURE: 131 MMHG | WEIGHT: 190 LBS | HEART RATE: 92 BPM | BODY MASS INDEX: 28.79 KG/M2 | HEIGHT: 68 IN | DIASTOLIC BLOOD PRESSURE: 64 MMHG

## 2020-10-23 DIAGNOSIS — I25.110 CORONARY ARTERY DISEASE INVOLVING NATIVE CORONARY ARTERY OF NATIVE HEART WITH UNSTABLE ANGINA PECTORIS (HCC): Primary | ICD-10-CM

## 2020-10-23 DIAGNOSIS — N18.1 CKD (CHRONIC KIDNEY DISEASE) STAGE 1, GFR 90 ML/MIN OR GREATER: ICD-10-CM

## 2020-10-23 DIAGNOSIS — Z01.818 PREOP TESTING: ICD-10-CM

## 2020-10-23 DIAGNOSIS — E78.5 HYPERLIPIDEMIA LDL GOAL <100: ICD-10-CM

## 2020-10-23 DIAGNOSIS — Z72.0 TOBACCO ABUSE: ICD-10-CM

## 2020-10-23 DIAGNOSIS — T50.8X5D ALLERGIC REACTION TO CONTRAST MATERIAL, SUBSEQUENT ENCOUNTER: ICD-10-CM

## 2020-10-23 DIAGNOSIS — I25.118 CORONARY ARTERY DISEASE OF NATIVE ARTERY OF NATIVE HEART WITH STABLE ANGINA PECTORIS (HCC): ICD-10-CM

## 2020-10-23 DIAGNOSIS — I10 ESSENTIAL HYPERTENSION: ICD-10-CM

## 2020-10-23 DIAGNOSIS — R53.82 CHRONIC FATIGUE: ICD-10-CM

## 2020-10-23 DIAGNOSIS — Z95.1 HISTORY OF CORONARY ARTERY BYPASS GRAFT: ICD-10-CM

## 2020-10-23 PROCEDURE — 99214 OFFICE O/P EST MOD 30 MIN: CPT | Performed by: INTERNAL MEDICINE

## 2020-10-23 RX ORDER — CIMETIDINE 400 MG/1
TABLET, FILM COATED ORAL
Qty: 1 TABLET | Refills: 0 | Status: SHIPPED | OUTPATIENT
Start: 2020-10-23 | End: 2020-11-04 | Stop reason: HOSPADM

## 2020-10-23 RX ORDER — PREDNISONE 50 MG/1
TABLET ORAL
Qty: 3 TABLET | Refills: 0 | Status: SHIPPED | OUTPATIENT
Start: 2020-10-23 | End: 2020-11-04 | Stop reason: HOSPADM

## 2020-10-23 NOTE — PROGRESS NOTES
\[facility] [place] [facility]\"" [facility]        Subjective:     Encounter Date:10/23/2020      Patient ID: Rishi Galicia is a 65 y.o. male.      HPI: Rishi Galicia agreed to a video visit today secondary to the coronavirus pandemic.  He observes the CDC guidelines for social distancing.  He does not report any fever or cough.  He is free of any change in his sense of smell or taste.  Mr. Galicia has a history of coronary heart disease and is status post prior coronary bypass surgery in 2009.  He is also undergone several previous percutaneous coronary interventions.  He remains on dual antiplatelet therapy which he tolerates well.  He reports a recent development of significant left-sided chest discomfort.  He reports an episode last week lasting up to 10 minutes in duration.  Since then he has had recurrent mild left-sided chest discomfort.  He reports increased fatigue and dyspnea walking up and down stairs.  He is free of orthopnea or PND no syncope near syncope or palpitations.  He has a history of hypertension which is controlled.  He has known dyslipidemia and remains on Repatha, PCSK9 inhibitor.      The following portions of the patient's history were reviewed and updated as appropriate: allergies, current medications, past family history, past medical history, past social history, past surgical history and problem list.    Problem List:  Patient Active Problem List   Diagnosis   • Coronary artery disease involving native coronary artery of native heart with unstable angina pectoris (CMS/HCC)   • CKD (chronic kidney disease) stage 1, GFR 90 ml/min or greater   • Hyperlipidemia LDL goal <70   • Hypothyroidism   • GERD (gastroesophageal reflux disease)   • Tobacco abuse   • Visual color changes   • Fatigue   • Orthostatic dizziness   • Daytime somnolence   • Essential hypertension   • History of coronary artery bypass graft       Past Medical History:  Past Medical History:   Diagnosis Date   • CAD (coronary  artery disease)    • Chronic kidney disease    • Chronic kidney disease    • GERD (gastroesophageal reflux disease) 5/14/2019   • H/O echocardiogram 07/18/2018    LV size normal.  EF 60-65%.  Basal posterior wall hypokinesis.  AV is trileaflet.  Mild MR and TR.     • Hyperlipidemia    • Hypertension    • Peripheral vascular disease (CMS/HCC)    • Renal disorder    • Tobacco abuse        Past Surgical History:  Past Surgical History:   Procedure Laterality Date   • CARDIAC CATHETERIZATION  07/20/2018    Native 3 vessel disease.  Patent stent in the LCx.  native  LAD, non-dominant RCA has .  Patent LIMA to LAD, SVG-OM grafts.     • CARDIAC SURGERY     • CORONARY ANGIOPLASTY WITH STENT PLACEMENT     • CORONARY ANGIOPLASTY WITH STENT PLACEMENT      4/25/16 - SYNERGY stent to the distal left PDA  and distal and mid LIMA to LAD.  .  12/2/15 -  PROMUS stent ot the first obtuse marginal branch via SVG.     • CORONARY ARTERY BYPASS GRAFT  2009   • ILIAC ARTERY STENT  2016    S/P right common iliac artery stent placement and balloon angioplasty with no significant residual stenosis and balloon angioplasty of the left tibioperoneal trunk with no significant residual stenosis.  Performed b Dr. Carlo Grijalva.       Social History:  Social History     Socioeconomic History   • Marital status: Single     Spouse name: Not on file   • Number of children: Not on file   • Years of education: Not on file   • Highest education level: Not on file   Tobacco Use   • Smoking status: Current Every Day Smoker     Packs/day: 0.50     Types: Cigarettes   • Smokeless tobacco: Never Used   Substance and Sexual Activity   • Alcohol use: No     Frequency: Never   • Drug use: No   • Sexual activity: Defer       Allergies:  Allergies   Allergen Reactions   • Contrast Dye Hives         ROS:  Review of Systems   Constitution: Positive for malaise/fatigue.   HENT: Negative for hearing loss and nosebleeds.    Eyes: Negative for double vision and  "visual disturbance.   Cardiovascular: Positive for chest pain and dyspnea on exertion. Negative for claudication, near-syncope, orthopnea, palpitations, paroxysmal nocturnal dyspnea and syncope.   Respiratory: Negative for cough, shortness of breath, sleep disturbances due to breathing, snoring, sputum production and wheezing.    Endocrine: Negative for cold intolerance, heat intolerance, polydipsia and polyuria.   Hematologic/Lymphatic: Negative for adenopathy and bleeding problem. Does not bruise/bleed easily.   Skin: Negative for flushing and itching.   Musculoskeletal: Negative for back pain, falls, joint pain, joint swelling, muscle weakness and neck pain.   Gastrointestinal: Negative for abdominal pain, dysphagia, heartburn, nausea and vomiting.   Genitourinary: Negative for dysuria and frequency.   Neurological: Negative for disturbances in coordination, dizziness, light-headedness, loss of balance, numbness and weakness.   Psychiatric/Behavioral: Negative for altered mental status and memory loss. The patient is not nervous/anxious.    Allergic/Immunologic: Negative for hives and persistent infections.          Objective:         /64   Pulse 92   Ht 172.7 cm (68\")   Wt 86.2 kg (190 lb)   BMI 28.89 kg/m²     Physical Exam not performed    In-Office Procedure(s):  Procedures    ASCVD RIsk Score::  The ASCVD Risk score ([place] CORBIN Jr., et al., 2013) failed to calculate for the following reasons:    Cannot find a previous HDL lab    Cannot find a previous total cholesterol lab        Assessment/Plan:         1. Coronary artery disease involving native coronary artery of native heart with unstable angina pectoris (CMS/HCC)  Recurrent angina    2. History of coronary artery bypass graft  Recurrent angina    3. Essential hypertension  Controlled    4. Hyperlipidemia LDL goal <100  Continue low-cholesterol low saturated fat diet and PCSK9 inhibitor    5. Tobacco abuse  Counseled to discontinue    6. CKD " (chronic kidney disease) stage 1, GFR 90 ml/min or greater  Reassess    7. Allergic reaction to contrast material, subsequent encounter  Pretreatment  - predniSONE (DELTASONE) 50 MG tablet; Take 1 tablet at 6 am, 2pm & 10 pm the day before heart cath  Dispense: 3 tablet; Refill: 0  - cimetidine (TAGAMET) 400 MG tablet; Take 1 tablet the evening prior to heart cath  Dispense: 1 tablet; Refill: 0  - diphenhydrAMINE (BENADRYL) 50 MG tablet; Take 1 tablet the evening prior to heart cath & 1 tablet the morning of.  Dispense: 2 tablet; Refill: 0    8. Chronic fatigue  Progressive, anginal equivalent    Mr. Galicia reports recurrent and progressive anginal symptoms similar to his prior angina.  He was previously scheduled for a noninvasive ischemic assessment with stress nuclear and an echocardiogram.  His symptoms have progressed.  I have counseled Mr. Galicia on the importance of risk modification.  I have recommended he decrease and discontinue tobacco abuse.  Have encouraged him to continue low-cholesterol low saturated fat diet and follow his medical regimen.  We will proceed to coronary angiography.  We will cancel his nuclear stress test.  Risk and benefits been explained he understands and wishes to proceed.  We will reassess his renal function and hydrate him appropriately.    Unable to complete visit using a video connection to the patient. A phone visit was used to complete this visits. Total time of discussion was 17 minutes and 10 minutes to complete electronic medical record documentation.         Yunior Crook MD  10/23/20  .

## 2020-10-26 PROBLEM — I25.118 CORONARY ARTERY DISEASE OF NATIVE ARTERY OF NATIVE HEART WITH STABLE ANGINA PECTORIS (HCC): Status: ACTIVE | Noted: 2020-10-26

## 2020-10-27 ENCOUNTER — LAB (OUTPATIENT)
Dept: LAB | Facility: HOSPITAL | Age: 65
End: 2020-10-27

## 2020-10-27 ENCOUNTER — TRANSCRIBE ORDERS (OUTPATIENT)
Dept: ADMINISTRATIVE | Facility: HOSPITAL | Age: 65
End: 2020-10-27

## 2020-10-27 ENCOUNTER — TELEPHONE (OUTPATIENT)
Dept: CARDIOLOGY | Facility: CLINIC | Age: 65
End: 2020-10-27

## 2020-10-27 DIAGNOSIS — Z01.818 OTHER SPECIFIED PRE-OPERATIVE EXAMINATION: Primary | ICD-10-CM

## 2020-10-27 DIAGNOSIS — Z01.818 PREOP TESTING: ICD-10-CM

## 2020-10-27 DIAGNOSIS — T50.8X5D ALLERGIC REACTION TO CONTRAST MATERIAL, SUBSEQUENT ENCOUNTER: ICD-10-CM

## 2020-10-27 DIAGNOSIS — I25.118 CORONARY ARTERY DISEASE OF NATIVE ARTERY OF NATIVE HEART WITH STABLE ANGINA PECTORIS (HCC): ICD-10-CM

## 2020-10-27 LAB
ALBUMIN SERPL-MCNC: 4.6 G/DL (ref 3.5–5.2)
ALBUMIN/GLOB SERPL: 2.1 G/DL
ALP SERPL-CCNC: 55 U/L (ref 39–117)
ALT SERPL W P-5'-P-CCNC: 35 U/L (ref 1–41)
ANION GAP SERPL CALCULATED.3IONS-SCNC: 10.9 MMOL/L (ref 5–15)
AST SERPL-CCNC: 22 U/L (ref 1–40)
BASOPHILS # BLD AUTO: 0.04 10*3/MM3 (ref 0–0.2)
BASOPHILS NFR BLD AUTO: 0.7 % (ref 0–1.5)
BILIRUB SERPL-MCNC: 0.4 MG/DL (ref 0–1.2)
BUN SERPL-MCNC: 20 MG/DL (ref 8–23)
BUN/CREAT SERPL: 13.2 (ref 7–25)
CALCIUM SPEC-SCNC: 9.3 MG/DL (ref 8.6–10.5)
CHLORIDE SERPL-SCNC: 105 MMOL/L (ref 98–107)
CO2 SERPL-SCNC: 22.1 MMOL/L (ref 22–29)
CREAT SERPL-MCNC: 1.51 MG/DL (ref 0.76–1.27)
DEPRECATED RDW RBC AUTO: 43.2 FL (ref 37–54)
EOSINOPHIL # BLD AUTO: 0.16 10*3/MM3 (ref 0–0.4)
EOSINOPHIL NFR BLD AUTO: 2.9 % (ref 0.3–6.2)
ERYTHROCYTE [DISTWIDTH] IN BLOOD BY AUTOMATED COUNT: 13.1 % (ref 12.3–15.4)
GFR SERPL CREATININE-BSD FRML MDRD: 46 ML/MIN/1.73
GLOBULIN UR ELPH-MCNC: 2.2 GM/DL
GLUCOSE SERPL-MCNC: 98 MG/DL (ref 65–99)
HCT VFR BLD AUTO: 41.7 % (ref 37.5–51)
HGB BLD-MCNC: 14.3 G/DL (ref 13–17.7)
INR PPP: 1.12 (ref 0.9–1.1)
LYMPHOCYTES # BLD AUTO: 1.6 10*3/MM3 (ref 0.7–3.1)
LYMPHOCYTES NFR BLD AUTO: 28.7 % (ref 19.6–45.3)
MCH RBC QN AUTO: 30.8 PG (ref 26.6–33)
MCHC RBC AUTO-ENTMCNC: 34.3 G/DL (ref 31.5–35.7)
MCV RBC AUTO: 89.7 FL (ref 79–97)
MONOCYTES # BLD AUTO: 0.46 10*3/MM3 (ref 0.1–0.9)
MONOCYTES NFR BLD AUTO: 8.3 % (ref 5–12)
NEUTROPHILS NFR BLD AUTO: 3.29 10*3/MM3 (ref 1.7–7)
NEUTROPHILS NFR BLD AUTO: 59 % (ref 42.7–76)
PLATELET # BLD AUTO: 147 10*3/MM3 (ref 140–450)
PMV BLD AUTO: 11.2 FL (ref 6–12)
POTASSIUM SERPL-SCNC: 4.1 MMOL/L (ref 3.5–5.2)
PROT SERPL-MCNC: 6.8 G/DL (ref 6–8.5)
PROTHROMBIN TIME: 14.3 SECONDS (ref 11.7–14.2)
RBC # BLD AUTO: 4.65 10*6/MM3 (ref 4.14–5.8)
SODIUM SERPL-SCNC: 138 MMOL/L (ref 136–145)
WBC # BLD AUTO: 5.57 10*3/MM3 (ref 3.4–10.8)

## 2020-10-27 PROCEDURE — 85610 PROTHROMBIN TIME: CPT

## 2020-10-27 PROCEDURE — 80053 COMPREHEN METABOLIC PANEL: CPT

## 2020-10-27 PROCEDURE — 85025 COMPLETE CBC W/AUTO DIFF WBC: CPT

## 2020-10-27 PROCEDURE — 36415 COLL VENOUS BLD VENIPUNCTURE: CPT

## 2020-10-27 RX ORDER — CIMETIDINE 400 MG/1
TABLET, FILM COATED ORAL
Qty: 1 TABLET | Refills: 0 | Status: CANCELLED | OUTPATIENT
Start: 2020-10-27

## 2020-10-27 RX ORDER — PREDNISONE 50 MG/1
TABLET ORAL
Qty: 3 TABLET | Refills: 0 | Status: CANCELLED | OUTPATIENT
Start: 2020-10-27

## 2020-10-31 ENCOUNTER — LAB (OUTPATIENT)
Dept: LAB | Facility: HOSPITAL | Age: 65
End: 2020-10-31

## 2020-10-31 DIAGNOSIS — Z01.818 OTHER SPECIFIED PRE-OPERATIVE EXAMINATION: ICD-10-CM

## 2020-10-31 PROCEDURE — U0004 COV-19 TEST NON-CDC HGH THRU: HCPCS | Performed by: INTERNAL MEDICINE

## 2020-10-31 PROCEDURE — C9803 HOPD COVID-19 SPEC COLLECT: HCPCS

## 2020-11-02 LAB — SARS-COV-2 RNA RESP QL NAA+PROBE: NOT DETECTED

## 2020-11-03 ENCOUNTER — HOSPITAL ENCOUNTER (OUTPATIENT)
Facility: HOSPITAL | Age: 65
Discharge: HOME | End: 2020-11-04
Attending: INTERNAL MEDICINE | Admitting: INTERNAL MEDICINE

## 2020-11-03 DIAGNOSIS — Z98.61 CAD S/P PERCUTANEOUS CORONARY ANGIOPLASTY: Primary | ICD-10-CM

## 2020-11-03 DIAGNOSIS — I25.118 CORONARY ARTERY DISEASE OF NATIVE ARTERY OF NATIVE HEART WITH STABLE ANGINA PECTORIS (HCC): ICD-10-CM

## 2020-11-03 DIAGNOSIS — I25.10 CAD S/P PERCUTANEOUS CORONARY ANGIOPLASTY: Primary | ICD-10-CM

## 2020-11-03 LAB
ACT BLD: 180 SECONDS (ref 82–152)
ACT BLD: 202 SECONDS (ref 82–152)
ACT BLD: 208 SECONDS (ref 82–152)

## 2020-11-03 PROCEDURE — C1894 INTRO/SHEATH, NON-LASER: HCPCS | Performed by: INTERNAL MEDICINE

## 2020-11-03 PROCEDURE — G0378 HOSPITAL OBSERVATION PER HR: HCPCS

## 2020-11-03 PROCEDURE — 93459 L HRT ART/GRFT ANGIO: CPT | Performed by: INTERNAL MEDICINE

## 2020-11-03 PROCEDURE — 85347 COAGULATION TIME ACTIVATED: CPT

## 2020-11-03 PROCEDURE — 0 IOPAMIDOL PER 1 ML: Performed by: INTERNAL MEDICINE

## 2020-11-03 PROCEDURE — C1725 CATH, TRANSLUMIN NON-LASER: HCPCS | Performed by: INTERNAL MEDICINE

## 2020-11-03 PROCEDURE — A9270 NON-COVERED ITEM OR SERVICE: HCPCS | Performed by: INTERNAL MEDICINE

## 2020-11-03 PROCEDURE — 99152 MOD SED SAME PHYS/QHP 5/>YRS: CPT | Performed by: INTERNAL MEDICINE

## 2020-11-03 PROCEDURE — 92937 PRQ TRLUML REVSC CAB GRF 1: CPT | Performed by: INTERNAL MEDICINE

## 2020-11-03 PROCEDURE — 25010000003 ATROPINE SULFATE: Performed by: INTERNAL MEDICINE

## 2020-11-03 PROCEDURE — C1887 CATHETER, GUIDING: HCPCS | Performed by: INTERNAL MEDICINE

## 2020-11-03 PROCEDURE — 25010000002 MIDAZOLAM PER 1 MG: Performed by: INTERNAL MEDICINE

## 2020-11-03 PROCEDURE — C1769 GUIDE WIRE: HCPCS | Performed by: INTERNAL MEDICINE

## 2020-11-03 PROCEDURE — 63710000001 TICAGRELOR 90 MG TABLET: Performed by: INTERNAL MEDICINE

## 2020-11-03 PROCEDURE — C1874 STENT, COATED/COV W/DEL SYS: HCPCS | Performed by: INTERNAL MEDICINE

## 2020-11-03 PROCEDURE — 99153 MOD SED SAME PHYS/QHP EA: CPT | Performed by: INTERNAL MEDICINE

## 2020-11-03 PROCEDURE — 25010000002 FENTANYL CITRATE (PF) 100 MCG/2ML SOLUTION: Performed by: INTERNAL MEDICINE

## 2020-11-03 PROCEDURE — C9604 PERC D-E COR REVASC T CABG S: HCPCS | Performed by: INTERNAL MEDICINE

## 2020-11-03 PROCEDURE — 25010000002 HEPARIN (PORCINE) PER 1000 UNITS: Performed by: INTERNAL MEDICINE

## 2020-11-03 DEVICE — XIENCE SIERRA™ EVEROLIMUS ELUTING CORONARY STENT SYSTEM 2.50 MM X 15 MM / RAPID-EXCHANGE
Type: IMPLANTABLE DEVICE | Status: FUNCTIONAL
Brand: XIENCE SIERRA™

## 2020-11-03 RX ORDER — LOSARTAN POTASSIUM 25 MG/1
25 TABLET ORAL DAILY
Status: DISCONTINUED | OUTPATIENT
Start: 2020-11-04 | End: 2020-11-04 | Stop reason: HOSPADM

## 2020-11-03 RX ORDER — SODIUM CHLORIDE 0.9 % (FLUSH) 0.9 %
10 SYRINGE (ML) INJECTION AS NEEDED
Status: DISCONTINUED | OUTPATIENT
Start: 2020-11-03 | End: 2020-11-03 | Stop reason: HOSPADM

## 2020-11-03 RX ORDER — HEPARIN SODIUM 1000 [USP'U]/ML
INJECTION, SOLUTION INTRAVENOUS; SUBCUTANEOUS AS NEEDED
Status: DISCONTINUED | OUTPATIENT
Start: 2020-11-03 | End: 2020-11-03 | Stop reason: HOSPADM

## 2020-11-03 RX ORDER — SODIUM CHLORIDE 0.9 % (FLUSH) 0.9 %
3 SYRINGE (ML) INJECTION EVERY 12 HOURS SCHEDULED
Status: DISCONTINUED | OUTPATIENT
Start: 2020-11-03 | End: 2020-11-03 | Stop reason: HOSPADM

## 2020-11-03 RX ORDER — MIDAZOLAM HYDROCHLORIDE 1 MG/ML
INJECTION INTRAMUSCULAR; INTRAVENOUS AS NEEDED
Status: DISCONTINUED | OUTPATIENT
Start: 2020-11-03 | End: 2020-11-03 | Stop reason: HOSPADM

## 2020-11-03 RX ORDER — FENTANYL CITRATE 50 UG/ML
INJECTION, SOLUTION INTRAMUSCULAR; INTRAVENOUS AS NEEDED
Status: DISCONTINUED | OUTPATIENT
Start: 2020-11-03 | End: 2020-11-03 | Stop reason: HOSPADM

## 2020-11-03 RX ORDER — SODIUM CHLORIDE 9 MG/ML
75 INJECTION, SOLUTION INTRAVENOUS CONTINUOUS
Status: DISCONTINUED | OUTPATIENT
Start: 2020-11-03 | End: 2020-11-04 | Stop reason: HOSPADM

## 2020-11-03 RX ORDER — TAMSULOSIN HYDROCHLORIDE 0.4 MG/1
0.8 CAPSULE ORAL DAILY
Status: DISCONTINUED | OUTPATIENT
Start: 2020-11-04 | End: 2020-11-04 | Stop reason: HOSPADM

## 2020-11-03 RX ORDER — CHOLECALCIFEROL (VITAMIN D3) 125 MCG
1000 CAPSULE ORAL DAILY
Status: DISCONTINUED | OUTPATIENT
Start: 2020-11-04 | End: 2020-11-04 | Stop reason: HOSPADM

## 2020-11-03 RX ORDER — LEVOTHYROXINE SODIUM 175 UG/1
175 TABLET ORAL DAILY
Status: DISCONTINUED | OUTPATIENT
Start: 2020-11-04 | End: 2020-11-04 | Stop reason: HOSPADM

## 2020-11-03 RX ORDER — NITROGLYCERIN 0.4 MG/1
0.4 TABLET SUBLINGUAL
Status: DISCONTINUED | OUTPATIENT
Start: 2020-11-03 | End: 2020-11-04 | Stop reason: HOSPADM

## 2020-11-03 RX ORDER — LIDOCAINE HYDROCHLORIDE 20 MG/ML
INJECTION, SOLUTION INFILTRATION; PERINEURAL AS NEEDED
Status: DISCONTINUED | OUTPATIENT
Start: 2020-11-03 | End: 2020-11-03 | Stop reason: HOSPADM

## 2020-11-03 RX ORDER — AMLODIPINE BESYLATE 2.5 MG/1
2.5 TABLET ORAL DAILY
Status: DISCONTINUED | OUTPATIENT
Start: 2020-11-04 | End: 2020-11-04 | Stop reason: HOSPADM

## 2020-11-03 RX ORDER — SODIUM CHLORIDE 9 MG/ML
250 INJECTION, SOLUTION INTRAVENOUS ONCE AS NEEDED
Status: DISCONTINUED | OUTPATIENT
Start: 2020-11-03 | End: 2020-11-04 | Stop reason: HOSPADM

## 2020-11-03 RX ORDER — SODIUM CHLORIDE 9 MG/ML
INJECTION, SOLUTION INTRAVENOUS CONTINUOUS PRN
Status: COMPLETED | OUTPATIENT
Start: 2020-11-03 | End: 2020-11-03

## 2020-11-03 RX ORDER — LIDOCAINE HYDROCHLORIDE 10 MG/ML
0.1 INJECTION, SOLUTION EPIDURAL; INFILTRATION; INTRACAUDAL; PERINEURAL ONCE AS NEEDED
Status: DISCONTINUED | OUTPATIENT
Start: 2020-11-03 | End: 2020-11-03 | Stop reason: HOSPADM

## 2020-11-03 RX ORDER — ASPIRIN 81 MG/1
TABLET, CHEWABLE ORAL AS NEEDED
Status: DISCONTINUED | OUTPATIENT
Start: 2020-11-03 | End: 2020-11-03 | Stop reason: HOSPADM

## 2020-11-03 RX ORDER — ASPIRIN 81 MG/1
81 TABLET, CHEWABLE ORAL DAILY
Status: DISCONTINUED | OUTPATIENT
Start: 2020-11-04 | End: 2020-11-04 | Stop reason: HOSPADM

## 2020-11-03 RX ORDER — PANTOPRAZOLE SODIUM 40 MG/1
40 TABLET, DELAYED RELEASE ORAL DAILY
Status: DISCONTINUED | OUTPATIENT
Start: 2020-11-04 | End: 2020-11-04 | Stop reason: HOSPADM

## 2020-11-03 RX ORDER — ACETAMINOPHEN 325 MG/1
650 TABLET ORAL EVERY 4 HOURS PRN
Status: DISCONTINUED | OUTPATIENT
Start: 2020-11-03 | End: 2020-11-04 | Stop reason: HOSPADM

## 2020-11-03 RX ADMIN — SODIUM CHLORIDE 75 ML/HR: 9 INJECTION, SOLUTION INTRAVENOUS at 11:12

## 2020-11-03 RX ADMIN — ATROPINE SULFATE 0.5 MG: 0.1 INJECTION PARENTERAL at 17:35

## 2020-11-03 RX ADMIN — ATROPINE SULFATE 0.5 MG: 0.1 INJECTION PARENTERAL at 17:27

## 2020-11-03 RX ADMIN — TICAGRELOR 90 MG: 90 TABLET ORAL at 20:58

## 2020-11-03 RX ADMIN — ATROPINE SULFATE 0.5 MG: 0.1 INJECTION PARENTERAL at 17:36

## 2020-11-03 RX ADMIN — ATROPINE SULFATE 0.5 MG: 0.1 INJECTION PARENTERAL at 17:22

## 2020-11-03 NOTE — DISCHARGE SUMMARY
[facility]  Pierre Kang MD, PhD  DISCHARGE SUMMARY      Patient Name: Rishi Galicia  :1955  65 y.o.    Date of Admit: 11/3/2020  Date of Discharge:  2020    Discharge Diagnosis:  Problems Addressed this Visit        Cardiovascular and Mediastinum    * (Principal) Coronary artery disease of native artery of native heart with stable angina pectoris (CMS/HCC)    Relevant Medications    ticagrelor (BRILINTA) 90 MG tablet tablet    Other Relevant Orders    Cardiac Catheterization/Vascular Study (Completed)    CAD S/P percutaneous coronary angioplasty - Primary    Relevant Medications    ticagrelor (BRILINTA) 90 MG tablet tablet    Other Relevant Orders    Referral to Cardiac Rehab      Diagnoses       Codes Comments    CAD S/P percutaneous coronary angioplasty    -  Primary ICD-10-CM: I25.10, Z98.61  ICD-9-CM: 414.01, V45.82     Coronary artery disease of native artery of native heart with stable angina pectoris (CMS/HCC)     ICD-10-CM: I25.118  ICD-9-CM: 414.01, 413.9         1) CAD with prior CABG and PCI s/p PCI with JAMES to SVG to OM2 11/3  - 2D echo  showed EF = 60-65% with grade 2 diastolic dysfunction, mild MR/TR.   - continue on dual antiplatelet therapy with aspirin and brilinta, repatha, not on beta blocker given prior intolerance (fatigue)    2) CKD stage III  - Cr 1.51 --> 1.59 post cath     3) PVD s/p right iliac artery stent     4) HTN  - controlled    5) HLD  - continue on repatha    6) Hx hypothyroidism      Seen and examined  Status post PCI, groin stable.  No acute events overnight.  Chest pain-free ambulatory without difficulty.  No chest pain overnight no electrical instability.  Medications discussed.  Concerning signs or symptoms discussed.  Restrictions discussed.  Follow-up discussed.  Answered all questions posed by the patient today at bedside  Coordination of care with nursing staff performed today  Medications reviewed  Cath results  reviewed    Hospital Course:   Mr. Galicia has a history of coronary heart disease and is status post prior coronary bypass surgery in 2009.  He is also undergone several previous percutaneous coronary interventions.  He remains on dual antiplatelet therapy which he tolerates well.  He reports a recent development of significant left-sided chest discomfort.  He reports an episode last week lasting up to 10 minutes in duration.  Since then he has had recurrent mild left-sided chest discomfort.  He reports increased fatigue and dyspnea walking up and down stairs.  He is free of orthopnea or PND no syncope near syncope or palpitations.  He has a history of hypertension which is controlled.  He has known dyslipidemia and remains on Repatha, PCSK9 inhibitor.    Rishi Galicia has a history of coronary heart disease status post previous coronary artery bypass surgery and multiple previous percutaneous coronary intervention with stent placement.  He presents now with new onset of his typical angina with a progressive pattern.  We will proceed to coronary angiography.  Risk benefits been explained patient understands.    Patient underwent left heart catheterization s/p successful PCI with JAMES to SVG to OM2.  He was observed overnight in the hospital.  Post cath he had a brief vagal episode with hypotension with his sheath pull.  BP is normotensive this morning.  He also developed a post cath hematoma.  Today his groin site has large ecchymosis but without hematoma.  Patient reports mild soreness but has ambulated without difficulty.  He has a mild but acceptable bump in Cr 1.51 --> 1.59 post cath.  Patient reports feeling well with no further chest pain or dyspnea.  As d/w Dr. Kang and Dr. Yunior Crook, patient will be discharged home today.        Procedures Performed  Procedure(s):  Left Heart Cath  Coronary angiography  Left ventriculography  Saphenous Vein Graft  Percutaneous Coronary  Intervention    Indications    Coronary artery disease of native artery of native heart with stable angina pectoris (CMS/[facility]) [I25.118 (ICD-10-CM)]       Conclusion    CARDIAC CATHETERIZATION REPORT     Procedure:Left heart catheterization      DATE OF PROCEDURE: 11/03/20     PROCEDURE PERFORMED BY: Yunior Crook MD, [facility]     INDICATION FOR PROCEDURE: Class II-III angina, coronary heart disease status post coronary artery bypass surgery and multiple previous percutaneous coronary interventions     DESCRIPTION OF PROCEDURE: After consent was obtained, access was gained in his right femoral artery using a micropuncture technique. A 5-[place] short sheath was placed without difficulty.  Left ventriculography was performed followed by selective injection of the left and right coronary arteries were performed using a JL-3.5 and JR-4 diagnostic catheter.  The JR4 catheter was used to obtain an imaging of the right internal mammary artery, the left internal mammary artery bypass graft and the saphenous vein graft to the second marginal branch of the circumflex.  Patient tolerated the procedure well without early complication and EBL was minimal.  Following coronary angiography we proceeded to percutaneous coronary intervention to the distal region of the saphenous vein graft to the second marginal branch of the circumflex.  We exchanged the 5 [place] right femoral artery sheath for a 6 [place] sheath.  We then advanced a 6 [place] LCB guide catheter engaging the saphenous vein graft to the circumflex.  Patient received a total of 325 mg of aspirin, Brilinta 180 mg low, and intravenous heparin according to protocol.  ACT's were monitored throughout the procedure.  Patient has known chronic kidney disease and was hydrated with a total of 675 cc of normal saline.  We then advanced a 0.014 BMW guidewire through the saphenous vein graft into the second marginal branch of the circumflex.  We predilated with a trek 2.5 x 20  balloon.  Then we returned with a Xience [place] 2.5 x 15 drug-eluting stent deploying at 9 barbie for 2 inflations.  The 80% stenosis at the distal saphenous vein graft to OM 2 was reduced to less than 10%.  Patient received a total of 218 cc of Isovue-370 contrast.  At the conclusion, the sheath was removed and hemostasis was obtained. The patient went to the prep holding area in stable condition.     Conscious sedation was for 58 minutes, I was present throughout the procedure.     FINDINGS:     LEFT VENTRICULOGRAPHY: The LV pressure was 106/14.  Aortic pressure 106/50.  There was no mitral insufficiency or gradient across the aortic valve on pullback.  Left ventriculogram revealed ejection fraction of 60 to 65%.     CORONARY ANGIOGRAPHY:  Left main: Left main no significant disease  Left anterior descending: Left anterior descending had a proximal total occlusion  Ramus intermedius:Not present  Circumflex: Circumflex demonstrated mid 30 to 40% narrowing.  First and third marginal branches were small in caliber.  Third marginal branch had a proximal 60 to 70% stenosis.  The second marginal branch was large in caliber with proximal total occlusion  RCA: Is a dominant vessel.  The right coronary artery had a proximal total occlusion there was evidence of right to right and left to right collaterals to the distal right coronary artery  LIMA to the LAD was widely patent.  The LAD after the distal anastomosis demonstrated 50% stenosis followed by previously placed stents from the mid LAD to the apex which were patent  MILES normal not used for bypass  Saphenous vein graft to the second marginal branch had a proximal 50% narrowing in distally at the anastomosis 80% irregularity was noted.     Percutaneous coronary intervention with successful deployment of a Xience [place] drug-eluting stent 2.5 x 15 to the distal anastomotic site of the saphenous vein graft to the marginal branch of the circumflex reducing an 80% to less  than 10%.     SUMMARY: 1.  Severe three-vessel coronary heart disease status post bypass surgery and multiple previous percutaneous coronary intervention 2.  Preserved left ventricular function with with ejection fraction 60 to 65% 3.  Status post successful deployment of a Xience [place] drug-eluting stent 2.5 x 15 to the distal anastomotic site of the saphenous vein graft to OM 2 reducing an 80% stenosis to less than 10%.     RECOMMENDATIONS: 1.  Strict risk modification with absolute discontinuation of tobacco use 2.  Optimal medical management 3.  Aerobic exercise program 4.  Dual antiplatelet therapy indefinitely     Yunior Crook MD  11/03/20  14:23 EST               Consults     No orders found for last 30 day(s).          Pertinent Test Results:   Results from last 7 days   Lab Units 11/04/20  0440   SODIUM mmol/L 141   POTASSIUM mmol/L 3.8   CHLORIDE mmol/L 109*   CO2 mmol/L 23.6   BUN mg/dL 25*   CREATININE mg/dL 1.59*   CALCIUM mg/dL 8.7   GLUCOSE mg/dL 75         @LABRCNT(bnp)@                    ECHOCARDIOGRAM:    Results for orders placed in visit on 07/18/18   SCANNED - ECHOCARDIOGRAM       Physical Exam  Neuro: AAOx3  CV: S1S2 RRR, no murmur  Resp: non-labored, CTA  GI: BS+, abd soft  Ext:pedal pulses palp, no edema, right groin ecchymosis without hematoma  Tele: SR    Agree with physical exam findings assess face-to-face on my encounter with the patient    Condition on Discharge: stable    Discharge Medications     Discharge Medications      New Medications      Instructions Start Date   acetaminophen 325 MG tablet  Commonly known as: TYLENOL   650 mg, Oral, Every 4 Hours PRN      ticagrelor 90 MG tablet tablet  Commonly known as: BRILINTA   90 mg, Oral, 2 Times Daily         Continue These Medications      Instructions Start Date   amLODIPine 5 MG tablet  Commonly known as: NORVASC   TAKE ONE-HALF TABLET BY  MOUTH DAILY      aspirin 81 MG chewable tablet   81 mg, Oral, Daily      isosorbide  mononitrate 60 MG 24 hr tablet  Commonly known as: IMDUR   90 mg, Oral, Daily      levothyroxine 175 MCG tablet  Commonly known as: SYNTHROID, LEVOTHROID   175 mcg, Oral, Daily      losartan 50 MG tablet  Commonly known as: COZAAR   25 mg, Oral, Daily      nitroglycerin 0.4 MG SL tablet  Commonly known as: NITROSTAT   1 under the tongue as needed for angina, may repeat q5mins for up three doses      pantoprazole 40 MG EC tablet  Commonly known as: PROTONIX   40 mg, Oral, Daily      Repatha SureClick 140 MG/ML solution auto-injector  Generic drug: Evolocumab   1 syringe, Subcutaneous, Every 14 Days      tamsulosin 0.4 MG capsule 24 hr capsule  Commonly known as: FLOMAX   2 capsules, Oral, Daily      vitamin B-12 1000 MCG tablet  Commonly known as: CYANOCOBALAMIN   1,000 mcg, Oral, Daily         Stop These Medications    cimetidine 400 MG tablet  Commonly known as: TAGAMET     clopidogrel 75 MG tablet  Commonly known as: PLAVIX     diphenhydrAMINE 50 MG tablet  Commonly known as: BENADRYL     predniSONE 50 MG tablet  Commonly known as: DELTASONE            Discharge Diet:   Diet Instructions     Diet: Cardiac      Discharge Diet: Cardiac          Activity at Discharge:   Activity Instructions     Bathing Restrictions      For one week    Type of Restriction: Bathing    Bathing Restrictions: No Tub Bath    Driving Restrictions      Type of Restriction: Driving    Driving Restrictions: No Driving (Time Limited)    Length: Other    Indicate Length of Restriction: 3 days post heart catheterization    Lifting Restrictions      Type of Restriction: Lifting    Lifting Restrictions: Lifting Restriction (Indicate Limit)    Weight Limit (Pounds): 4    Length of Lifting Restriction: 3 days post heart catherization          Discharge disposition: home    Follow-up Appointments  Future Appointments   Date Time Provider Department Center   11/13/2020  9:00 AM [facility]   11/13/2020 10:15 AM  Yunior Crook MD [facility]     Additional Instructions for the Follow-ups that You Need to Schedule     Referral to Cardiac Rehab   As directed            Test Results Pending at Discharge     Electronically signed by PAMELA Donald, 11/04/20, 9:30 AM EST.    Time: > 30 minutes spent on discharge

## 2020-11-03 NOTE — H&P
[facility] H&P        Subjective:     Encounter Date:10/26/2020      Patient ID: Rishi Galicia is a 65 y.o. male.      Chief Complaint:    HPI:Mr. Galicia has a history of coronary heart disease and is status post prior coronary bypass surgery in 2009.  He is also undergone several previous percutaneous coronary interventions.  He remains on dual antiplatelet therapy which he tolerates well.  He reports a recent development of significant left-sided chest discomfort.  He reports an episode last week lasting up to 10 minutes in duration.  Since then he has had recurrent mild left-sided chest discomfort.  He reports increased fatigue and dyspnea walking up and down stairs.  He is free of orthopnea or PND no syncope near syncope or palpitations.  He has a history of hypertension which is controlled.  He has known dyslipidemia and remains on Repatha, PCSK9 inhibitor.         The following portions of the patient's history were reviewed and updated as appropriate: allergies, current medications, past family history, past medical history, past social history, past surgical history and problem list.      Past Medical History:   Diagnosis Date   • CAD (coronary artery disease)    • Chronic kidney disease    • Chronic kidney disease    • GERD (gastroesophageal reflux disease) 5/14/2019   • H/O echocardiogram 07/18/2018    LV size normal.  EF 60-65%.  Basal posterior wall hypokinesis.  AV is trileaflet.  Mild MR and TR.     • Hyperlipidemia    • Hypertension    • Peripheral vascular disease (CMS/HCC)    • Renal disorder    • Tobacco abuse          Past Surgical History:   Procedure Laterality Date   • CARDIAC CATHETERIZATION  07/20/2018    Native 3 vessel disease.  Patent stent in the LCx.  native  LAD, non-dominant RCA has .  Patent LIMA to LAD, SVG-OM grafts.     • CARDIAC SURGERY     • CORONARY ANGIOPLASTY WITH STENT PLACEMENT     • CORONARY ANGIOPLASTY WITH STENT PLACEMENT      4/25/16 -  SYNERGY stent to the distal left PDA  and distal and mid LIMA to LAD.  .  12/2/15 -  PROMUS stent ot the first obtuse marginal branch via SVG.     • CORONARY ARTERY BYPASS GRAFT  2009   • ILIAC ARTERY STENT  2016    S/P right common iliac artery stent placement and balloon angioplasty with no significant residual stenosis and balloon angioplasty of the left tibioperoneal trunk with no significant residual stenosis.  Performed b Dr. Carlo Grijalva.   • KNEE CARTILAGE SURGERY Right          Social History     Socioeconomic History   • Marital status: Single     Spouse name: Not on file   • Number of children: Not on file   • Years of education: Not on file   • Highest education level: Not on file   Tobacco Use   • Smoking status: Current Every Day Smoker     Packs/day: 0.50     Types: Cigarettes   • Smokeless tobacco: Never Used   Substance and Sexual Activity   • Alcohol use: No     Frequency: Never   • Drug use: No   • Sexual activity: Defer         Allergies   Allergen Reactions   • Contrast Dye Hives       Current Medications:   Scheduled Meds:sodium chloride, 3 mL, Intravenous, Q12H      Continuous Infusions:sodium chloride, 75 mL/hr, Last Rate: 75 mL/hr (11/03/20 1112)        Review of Systems   Constitution: Positive for malaise/fatigue.   HENT: Negative for hearing loss and nosebleeds.    Eyes: Negative for double vision and visual disturbance.   Cardiovascular: Positive for chest pain and dyspnea on exertion. Negative for claudication, near-syncope, orthopnea, palpitations, paroxysmal nocturnal dyspnea and syncope.   Respiratory: Negative for cough, shortness of breath, sleep disturbances due to breathing, snoring, sputum production and wheezing.    Endocrine: Negative for cold intolerance, heat intolerance, polydipsia and polyuria.   Hematologic/Lymphatic: Negative for adenopathy and bleeding problem. Does not bruise/bleed easily.   Skin: Negative for flushing and itching.   Musculoskeletal: Negative for back  "pain, falls, joint pain, joint swelling, muscle weakness and neck pain.   Gastrointestinal: Negative for abdominal pain, dysphagia, heartburn, nausea and vomiting.   Genitourinary: Negative for dysuria and frequency.   Neurological: Negative for disturbances in coordination, dizziness, light-headedness, loss of balance, numbness and weakness.   Psychiatric/Behavioral: Negative for altered mental status and memory loss. The patient is not nervous/anxious.    Allergic/Immunologic: Negative for hives and persistent infections.          Objective:         /81 (BP Location: Left arm, Patient Position: Lying)   Pulse 97   Temp 97.8 °F (36.6 °C) (Temporal)   Resp 18   Ht 170.2 cm (67\")   Wt 85.3 kg (188 lb)   SpO2 96%   BMI 29.44 kg/m²     Constitutional:       Appearance: Well-developed.   Eyes:      Conjunctiva/sclera: Conjunctivae normal.      Pupils: Pupils are equal, round, and reactive to light.   HENT:      Head: Normocephalic and atraumatic.   Neck:      Musculoskeletal: Neck supple.      Thyroid: No thyromegaly.   Pulmonary:      Effort: Pulmonary effort is normal. No respiratory distress.      Breath sounds: Normal breath sounds. No wheezing. No rales.   Cardiovascular:      Normal rate. Regular rhythm.      S4 Gallop. No S3 gallop. Midsystolic click click.   Edema:     Peripheral edema absent.   Abdominal:      General: Bowel sounds are normal. There is no distension.      Palpations: Abdomen is soft. There is no abdominal mass.      Tenderness: There is no abdominal tenderness.   Musculoskeletal: Normal range of motion.   Skin:     General: Skin is warm and dry.      Findings: No erythema.   Neurological:      Mental Status: Alert and oriented to person, place, and time.               ASCVD RIsk Score::  The ASCVD Risk score ([place] DC Jr., et al., 2013) failed to calculate for the following reasons:    Cannot find a previous HDL lab    Cannot find a previous total cholesterol lab      Recent " Radiology:  Imaging Results (Most Recent)     None          EKG:        Assessment:         Active Hospital Problems    Diagnosis POA   • **Coronary artery disease of native artery of native heart with stable angina pectoris (CMS/HCC) [I25.118] Unknown     Added automatically from request for surgery [id number]     • History of coronary artery bypass graft [Z95.1] Not Applicable   • Essential hypertension [I10] Yes   • Fatigue [R53.83] Yes   • Tobacco abuse [Z72.0] Yes   • CKD (chronic kidney disease) stage 1, GFR 90 ml/min or greater [N18.1] Yes   • Hyperlipidemia LDL goal <70 [E78.5] Yes          Plan:       Rishi Galicia has a history of coronary heart disease status post previous coronary artery bypass surgery and multiple previous percutaneous coronary intervention with stent placement.  He presents now with new onset of his typical angina with a progressive pattern.  We will proceed to coronary angiography.  Risk benefits been explained patient understands.             Yunior Crook MD  11/03/20  12:48 EST

## 2020-11-04 VITALS
WEIGHT: 189.6 LBS | BODY MASS INDEX: 29.76 KG/M2 | RESPIRATION RATE: 18 BRPM | DIASTOLIC BLOOD PRESSURE: 71 MMHG | HEART RATE: 69 BPM | HEIGHT: 67 IN | TEMPERATURE: 97.5 F | SYSTOLIC BLOOD PRESSURE: 135 MMHG | OXYGEN SATURATION: 97 %

## 2020-11-04 PROBLEM — I25.10 CAD S/P PERCUTANEOUS CORONARY ANGIOPLASTY: Status: ACTIVE | Noted: 2020-11-04

## 2020-11-04 PROBLEM — Z98.61 CAD S/P PERCUTANEOUS CORONARY ANGIOPLASTY: Status: ACTIVE | Noted: 2020-11-04

## 2020-11-04 LAB
ANION GAP SERPL CALCULATED.3IONS-SCNC: 8.4 MMOL/L (ref 5–15)
BUN SERPL-MCNC: 25 MG/DL (ref 8–23)
BUN/CREAT SERPL: 15.7 (ref 7–25)
CALCIUM SPEC-SCNC: 8.7 MG/DL (ref 8.6–10.5)
CHLORIDE SERPL-SCNC: 109 MMOL/L (ref 98–107)
CO2 SERPL-SCNC: 23.6 MMOL/L (ref 22–29)
CREAT SERPL-MCNC: 1.59 MG/DL (ref 0.76–1.27)
GFR SERPL CREATININE-BSD FRML MDRD: 44 ML/MIN/1.73
GLUCOSE BLDC GLUCOMTR-MCNC: 84 MG/DL (ref 70–130)
GLUCOSE SERPL-MCNC: 75 MG/DL (ref 65–99)
POTASSIUM SERPL-SCNC: 3.8 MMOL/L (ref 3.5–5.2)
SODIUM SERPL-SCNC: 141 MMOL/L (ref 136–145)

## 2020-11-04 PROCEDURE — 63710000001 AMLODIPINE 2.5 MG TABLET: Performed by: INTERNAL MEDICINE

## 2020-11-04 PROCEDURE — A9270 NON-COVERED ITEM OR SERVICE: HCPCS | Performed by: INTERNAL MEDICINE

## 2020-11-04 PROCEDURE — 80048 BASIC METABOLIC PNL TOTAL CA: CPT | Performed by: INTERNAL MEDICINE

## 2020-11-04 PROCEDURE — 63710000001 LOSARTAN 25 MG TABLET: Performed by: INTERNAL MEDICINE

## 2020-11-04 PROCEDURE — 63710000001 TICAGRELOR 90 MG TABLET: Performed by: INTERNAL MEDICINE

## 2020-11-04 PROCEDURE — 63710000001 PANTOPRAZOLE 40 MG TABLET DELAYED-RELEASE: Performed by: INTERNAL MEDICINE

## 2020-11-04 PROCEDURE — 63710000001 ISOSORBIDE MONONITRATE 30 MG TABLET SUSTAINED-RELEASE 24 HOUR: Performed by: INTERNAL MEDICINE

## 2020-11-04 PROCEDURE — 63710000001 ASPIRIN 81 MG CHEWABLE TABLET: Performed by: INTERNAL MEDICINE

## 2020-11-04 PROCEDURE — 63710000001 ISOSORBIDE MONONITRATE 60 MG TABLET SUSTAINED-RELEASE 24 HOUR: Performed by: INTERNAL MEDICINE

## 2020-11-04 PROCEDURE — 82962 GLUCOSE BLOOD TEST: CPT

## 2020-11-04 PROCEDURE — G0378 HOSPITAL OBSERVATION PER HR: HCPCS

## 2020-11-04 PROCEDURE — 99217 PR OBSERVATION CARE DISCHARGE MANAGEMENT: CPT | Performed by: INTERNAL MEDICINE

## 2020-11-04 PROCEDURE — 63710000001 VITAMIN B-12 500 MCG TABLET: Performed by: INTERNAL MEDICINE

## 2020-11-04 PROCEDURE — 93005 ELECTROCARDIOGRAM TRACING: CPT | Performed by: INTERNAL MEDICINE

## 2020-11-04 PROCEDURE — 93010 ELECTROCARDIOGRAM REPORT: CPT | Performed by: INTERNAL MEDICINE

## 2020-11-04 RX ORDER — ACETAMINOPHEN 325 MG/1
650 TABLET ORAL EVERY 4 HOURS PRN
COMMUNITY
Start: 2020-11-04

## 2020-11-04 RX ADMIN — Medication 1000 MCG: at 09:18

## 2020-11-04 RX ADMIN — LOSARTAN POTASSIUM 25 MG: 25 TABLET, FILM COATED ORAL at 10:51

## 2020-11-04 RX ADMIN — TICAGRELOR 90 MG: 90 TABLET ORAL at 09:19

## 2020-11-04 RX ADMIN — ISOSORBIDE MONONITRATE 90 MG: 60 TABLET ORAL at 09:19

## 2020-11-04 RX ADMIN — PANTOPRAZOLE SODIUM 40 MG: 40 TABLET, DELAYED RELEASE ORAL at 09:19

## 2020-11-04 RX ADMIN — AMLODIPINE BESYLATE 2.5 MG: 2.5 TABLET ORAL at 09:19

## 2020-11-04 RX ADMIN — ASPIRIN 81 MG: 81 TABLET, CHEWABLE ORAL at 09:19

## 2020-11-04 NOTE — CONSULTS
Met with patient, discussed benefits of cardiac rehab. Provided phase II information along with the contact information for cardiac rehab here at [facility]. Patient stated that he attend cardiac rehab back in 2016 at [facility]. He completed the program but promised himself that he would not attend again. Stated walks a lot at home and stays active with his dog.

## 2020-11-04 NOTE — PROGRESS NOTES
Discharge Planning Assessment  [facility]     Patient Name: Rishi Galicia  MRN: [id number]  Today's Date: 11/4/2020    Admit Date: 11/3/2020    Discharge Needs Assessment     Row Name 11/04/20 1135       Living Environment    Lives With  alone    Current Living Arrangements  home/apartment/condo    Primary Care Provided by  self    Provides Primary Care For  no one    Family Caregiver if Needed  sibling(s)    Family Caregiver Names  Davy Cary brother [phone]    Quality of Family Relationships  supportive    Able to Return to Prior Arrangements  yes       Resource/Environmental Concerns    Resource/Environmental Concerns  none    Transportation Concerns  car, none       Transition Planning    Patient/Family Anticipates Transition to  home    Patient/Family Anticipated Services at Transition  none    Transportation Anticipated  family or friend will provide       Discharge Needs Assessment    Readmission Within the Last 30 Days  no previous admission in last 30 days    Equipment Currently Used at Home  none    Concerns to be Addressed  no discharge needs identified;denies needs/concerns at this time    Anticipated Changes Related to Illness  none    Equipment Needed After Discharge  none    Provided Post Acute Provider List?  N/A    N/A Provider List Comment  No needs identified    Provided Post Acute Provider Quality & Resource List?  N/A    N/A Quality & Resource List Comment  No needs identified    Current Discharge Risk  lives alone        Discharge Plan     Row Name 11/04/20 1139       Plan    Plan  Home    Patient/Family in Agreement with Plan  yes    Plan Comments  Met with patient at bedside. Face sheet verified. Prior to admission patient was living alone in his own home, independent with all aspects of his ADL’s and does not use any special or adaptive equipment.  Patient uses the [facility] on [address], denies any issues affording or taking his medications.  He is agreeable to using Meds to  Beds upon discharge. Patient does not have a POA or living will. Discharge plans discussed, plan is to return home.  No additional needs identified at this time. Family will transport home. Will continue to monitor for new or changing discharge needs.        Continued Care and Services - Discharged on 11/4/2020 Admission date: 11/3/2020 - Discharge disposition: Home or Self Care   Coordination has not been started for this encounter.       Expected Discharge Date and Time     Expected Discharge Date Expected Discharge Time    Nov 4, 2020         Demographic Summary     Row Name 11/04/20 1134       General Information    Admission Type  observation    Arrived From  home    Required Notices Provided  Observation Status Notice    Referral Source  admission list    Reason for Consult  discharge planning    Preferred Language  English     Used During This Interaction  no       Contact Information    Permission Granted to Share Info With  family/designee Davy garzon [phone]        Functional Status     Row Name 11/04/20 1135       Functional Status    Usual Activity Tolerance  good    Current Activity Tolerance  good       Functional Status, IADL    Medications  independent    Meal Preparation  independent    Housekeeping  independent    Laundry  independent    Shopping  independent       Mental Status    General Appearance WDL  WDL       Mental Status Summary    Recent Changes in Mental Status/Cognitive Functioning  no changes       Employment/    Employment Status  disabled        Psychosocial    No documentation.       Abuse/Neglect    No documentation.       Legal    No documentation.       Substance Abuse    No documentation.       Patient Forms    No documentation.           Domonique Man RN

## 2020-11-04 NOTE — PLAN OF CARE
Goal Outcome Evaluation:  Plan of Care Reviewed With: patient  Progress: improving  Outcome Summary: S/P cardiac cath with JAMES to OM graft. Pt RT groin site bruised, dressing CDI/soft/mildly tender. Pt SBP stablized to 100s after vagal response during sheath pull. No complaints, patient is feeling fine. Will continue to monitor closely. Likely D/C tomorrow.

## 2020-11-04 NOTE — PROGRESS NOTES
[facility] Clinical Pharmacy Services: National Cardiology Data Registry (NCDR) Medication Review    Rishi Galicia was counseled over medications for medical management of stent placement prior to discharge. Pharmacy to review discharge medications to make sure appropriate medications have been prescribed.     Patient has been discharged on the following:  · P2Y12 Inhibitor: Brilinta 90 mg BID  · Aspirin 81 mg  · High Intensity Statin: intolerance to statins noted in EMR review, on Repatha  · Beta-blocker: not on beta blocker given prior intolerance (fatigue)  · LVEF >40:no     These medications meet the requirements for NCDR discharge medication for chest pain and MI.     Ge Urena [facility]    Clinical Staff Pharmacist  Phone Extension [phone]

## 2020-11-04 NOTE — PLAN OF CARE
Problem: Adult Inpatient Plan of Care  Goal: Plan of Care Review  Outcome: Ongoing, Progressing  Flowsheets (Taken 11/4/2020 0300)  Progress: improving  Plan of Care Reviewed With: patient  Outcome Summary: Pt. had heart cath yesterday 11/3/20 1 stent placed to OM graft right femoral approach. Running NSR on heart monitor. No complaints of chest pain. Should d/c today will continue to monitor.

## 2020-11-04 NOTE — PROGRESS NOTES
Case Management Discharge Note      Final Note: Discharge home    Provided Post Acute Provider List?: N/A  N/A Provider List Comment: No needs identified  Provided Post Acute Provider Quality & Resource List?: N/A  N/A Quality & Resource List Comment: No needs identified    Selected Continued Care - Discharged on 11/4/2020 Admission date: 11/3/2020 - Discharge disposition: Home or Self Care    Destination    No services have been selected for the patient.              Durable Medical Equipment    No services have been selected for the patient.              Dialysis/Infusion    No services have been selected for the patient.              Home Medical Care    No services have been selected for the patient.              Therapy    No services have been selected for the patient.              Community Resources    No services have been selected for the patient.                  Transportation Services  Private: Car    Final Discharge Disposition Code: 01 - home or self-care

## 2020-11-05 LAB — QT INTERVAL: 455 MS

## 2020-11-11 DIAGNOSIS — Z98.61 CAD S/P PERCUTANEOUS CORONARY ANGIOPLASTY: Primary | ICD-10-CM

## 2020-11-11 DIAGNOSIS — I25.10 CAD S/P PERCUTANEOUS CORONARY ANGIOPLASTY: Primary | ICD-10-CM

## 2020-11-13 ENCOUNTER — HOSPITAL ENCOUNTER (OUTPATIENT)
Dept: SLEEP MEDICINE | Facility: HOSPITAL | Age: 65
End: 2020-11-13

## 2020-12-02 ENCOUNTER — TELEPHONE (OUTPATIENT)
Dept: CARDIOLOGY | Facility: CLINIC | Age: 65
End: 2020-12-02

## 2020-12-02 ENCOUNTER — TELEMEDICINE (OUTPATIENT)
Dept: CARDIOLOGY | Facility: CLINIC | Age: 65
End: 2020-12-02

## 2020-12-02 VITALS
HEART RATE: 87 BPM | SYSTOLIC BLOOD PRESSURE: 118 MMHG | WEIGHT: 188 LBS | DIASTOLIC BLOOD PRESSURE: 73 MMHG | HEIGHT: 67 IN | BODY MASS INDEX: 29.51 KG/M2

## 2020-12-02 DIAGNOSIS — R06.89 RESPIRATORY INSUFFICIENCY: ICD-10-CM

## 2020-12-02 DIAGNOSIS — I25.10 CORONARY ARTERY DISEASE INVOLVING NATIVE CORONARY ARTERY OF NATIVE HEART WITHOUT ANGINA PECTORIS: Primary | ICD-10-CM

## 2020-12-02 DIAGNOSIS — E78.5 HYPERLIPIDEMIA LDL GOAL <70: ICD-10-CM

## 2020-12-02 DIAGNOSIS — Z72.0 TOBACCO ABUSE: ICD-10-CM

## 2020-12-02 DIAGNOSIS — Z95.1 HISTORY OF CORONARY ARTERY BYPASS GRAFT: ICD-10-CM

## 2020-12-02 DIAGNOSIS — I10 ESSENTIAL HYPERTENSION: ICD-10-CM

## 2020-12-02 DIAGNOSIS — Z95.5 PRESENCE OF DRUG-ELUTING STENT IN LEFT CIRCUMFLEX CORONARY ARTERY: ICD-10-CM

## 2020-12-02 PROBLEM — Z98.61 CAD S/P PERCUTANEOUS CORONARY ANGIOPLASTY: Status: RESOLVED | Noted: 2020-11-04 | Resolved: 2020-12-02

## 2020-12-02 PROBLEM — I25.118 CORONARY ARTERY DISEASE OF NATIVE ARTERY OF NATIVE HEART WITH STABLE ANGINA PECTORIS (HCC): Status: RESOLVED | Noted: 2020-10-26 | Resolved: 2020-12-02

## 2020-12-02 PROBLEM — I25.110 CORONARY ARTERY DISEASE INVOLVING NATIVE CORONARY ARTERY OF NATIVE HEART WITH UNSTABLE ANGINA PECTORIS (HCC): Status: RESOLVED | Noted: 2019-05-14 | Resolved: 2020-12-02

## 2020-12-02 PROCEDURE — 99442 PR PHYS/QHP TELEPHONE EVALUATION 11-20 MIN: CPT | Performed by: INTERNAL MEDICINE

## 2020-12-02 RX ORDER — PRASUGREL 10 MG/1
10 TABLET, FILM COATED ORAL DAILY
Qty: 90 TABLET | Refills: 3 | Status: SHIPPED | OUTPATIENT
Start: 2020-12-02 | End: 2020-12-03

## 2020-12-02 RX ORDER — EZETIMIBE 10 MG/1
10 TABLET ORAL DAILY
Status: ON HOLD | COMMUNITY
End: 2021-05-04

## 2020-12-02 NOTE — PROGRESS NOTES
[facility] [place] [facility]        Subjective:     Encounter Date:12/02/2020      Patient ID: Rishi Galicia is a 65 y.o. male.      HPI: Rishi Galicia agreed to a telephone clinical visit today secondary to the coronavirus pandemic.  He is a pleasant 65-year-old male who observes CDC guidelines for social distancing.  He is free of fever or cough.  He does not report any change in his sense of smell or taste.  Mr. Galicia underwent percutaneous coronary intervention with deployment of drug-eluting stent to the distal anastomotic site of the saphenous vein graft to the marginal branch on 11/3/2020.  His anginal symptoms have resolved.  He does report some mild shortness of breath which he associates to Brilinta.  He is free of orthopnea or PND and no lower extremity edema.  He denies syncope near syncope or palpitations.      The following portions of the patient's history were reviewed and updated as appropriate: allergies, current medications, past family history, past medical history, past social history, past surgical history and problem list.    Problem List:  Patient Active Problem List   Diagnosis   • CKD (chronic kidney disease) stage 1, GFR 90 ml/min or greater   • Hyperlipidemia LDL goal <70   • Hypothyroidism   • GERD (gastroesophageal reflux disease)   • Tobacco abuse   • Visual color changes   • Fatigue   • Orthostatic dizziness   • Daytime somnolence   • Essential hypertension   • History of coronary artery bypass graft       Past Medical History:  Past Medical History:   Diagnosis Date   • CAD (coronary artery disease)    • Chronic kidney disease    • Chronic kidney disease    • GERD (gastroesophageal reflux disease) 5/14/2019   • H/O echocardiogram 07/18/2018    LV size normal.  EF 60-65%.  Basal posterior wall hypokinesis.  AV is trileaflet.  Mild MR and TR.     • Hyperlipidemia    • Hypertension    • Peripheral vascular disease (CMS/HCC)    • Renal disorder    • Tobacco abuse         Past Surgical History:  Past Surgical History:   Procedure Laterality Date   • CARDIAC CATHETERIZATION  07/20/2018    Native 3 vessel disease.  Patent stent in the LCx.  native  LAD, non-dominant RCA has .  Patent LIMA to LAD, SVG-OM grafts.     • CARDIAC CATHETERIZATION N/A 11/3/2020    Procedure: Left Heart Cath;  Surgeon: Yunior Crook MD;  Location:  [facility];  Service: Cardiovascular;  Laterality: N/A;   • CARDIAC CATHETERIZATION N/A 11/3/2020    Procedure: Coronary angiography;  Surgeon: Yunior Crook MD;  Location:  [facility];  Service: Cardiovascular;  Laterality: N/A;   • CARDIAC CATHETERIZATION N/A 11/3/2020    Procedure: Left ventriculography;  Surgeon: Yunior Crook MD;  Location:  [facility];  Service: Cardiovascular;  Laterality: N/A;   • CARDIAC CATHETERIZATION N/A 11/3/2020    Procedure: Saphenous Vein Graft;  Surgeon: Yunior Crook MD;  [facility];  Service: Cardiovascular;  Laterality: N/A;   • CARDIAC CATHETERIZATION N/A 11/3/2020    Procedure: Percutaneous Coronary Intervention;  Surgeon: Yunior Crook MD;  Location:  [facility];  Service: Cardiovascular;  Laterality: N/A;   • CARDIAC SURGERY     • CORONARY ANGIOPLASTY WITH STENT PLACEMENT     • CORONARY ANGIOPLASTY WITH STENT PLACEMENT      4/25/16 - SYNERGY stent to the distal left PDA  and distal and mid LIMA to LAD.  .  12/2/15 -  PROMUS stent ot the first obtuse marginal branch via SVG.     • CORONARY ARTERY BYPASS GRAFT  2009   • ILIAC ARTERY STENT  2016    S/P right common iliac artery stent placement and balloon angioplasty with no significant residual stenosis and balloon angioplasty of the left tibioperoneal trunk with no significant residual stenosis.  Performed b Dr. Carlo Grijalva.   • KNEE CARTILAGE SURGERY Right        Social History:  Social History     Socioeconomic History   • Marital status: Single      "Spouse name: Not on file   • Number of children: Not on file   • Years of education: Not on file   • Highest education level: Not on file   Tobacco Use   • Smoking status: Current Every Day Smoker     Packs/day: 0.50     Types: Cigarettes   • Smokeless tobacco: Never Used   Substance and Sexual Activity   • Alcohol use: No     Frequency: Never   • Drug use: No   • Sexual activity: Defer       Allergies:  Allergies   Allergen Reactions   • Contrast Dye Hives         ROS:  Review of Systems   Constitution: Negative for malaise/fatigue.   HENT: Negative for hearing loss and nosebleeds.    Eyes: Negative for double vision and visual disturbance.   Cardiovascular: Negative for chest pain, claudication, dyspnea on exertion, near-syncope, orthopnea, palpitations, paroxysmal nocturnal dyspnea and syncope.   Respiratory: Positive for shortness of breath. Negative for cough, sleep disturbances due to breathing, snoring, sputum production and wheezing.    Endocrine: Negative for cold intolerance, heat intolerance, polydipsia and polyuria.   Hematologic/Lymphatic: Negative for adenopathy and bleeding problem. Does not bruise/bleed easily.   Skin: Negative for flushing and itching.   Musculoskeletal: Negative for back pain, falls, joint pain, joint swelling, muscle weakness and neck pain.   Gastrointestinal: Negative for abdominal pain, dysphagia, heartburn, nausea and vomiting.   Genitourinary: Negative for dysuria and frequency.   Neurological: Negative for disturbances in coordination, dizziness, light-headedness, loss of balance, numbness and weakness.   Psychiatric/Behavioral: Negative for altered mental status and memory loss. The patient is not nervous/anxious.    Allergic/Immunologic: Negative for hives and persistent infections.          Objective:         /73   Pulse 87   Ht 170.2 cm (67\")   Wt 85.3 kg (188 lb)   BMI 29.44 kg/m²     Physical Exam not performed    In-Office Procedure(s):  Procedures    ASCVD " RIsk Score::  The ASCVD Risk score (Maximilian CORBIN Jr., et al., 2013) failed to calculate for the following reasons:    Cannot find a previous HDL lab    Cannot find a previous total cholesterol lab        Assessment/Plan:         1. Coronary artery disease involving native coronary artery of native heart without angina pectoris  Stable free of angina    2. Presence of drug-eluting stent in left circumflex coronary artery  Stable, continue dual antiplatelet therapy but change Brilinta to Effient    3. Essential hypertension  Controlled    4. Hyperlipidemia LDL goal <70  Continue low-cholesterol low saturated fat diet, Zetia, PCSK9 inhibitor    5. History of coronary artery bypass graft  Stable    6. Tobacco abuse  Encouraged to discontinue    7. Respiratory insufficiency  Multifactorial including tobacco use age deconditioning but also Brilinta    Mr. Galicia states that his anginal symptoms have resolved following recent drug-eluting stent deployment to the distal anastomotic site of the saphenous vein graft to the marginal branch of the circumflex.  He does report some respiratory insufficiency which he feels is related to Brilinta.  Have encouraged him to pursue risk modification by observing a low-cholesterol low saturated fat diet and discontinue tobacco use.  I will replace Brilinta with Effient 10 mg daily.  I will arrange for follow-up in 3 months certainly sooner if needed.  I spent 14 minutes on telephone visit and 10 minutes completing electronic medical record documentation.       Yunior Crook MD  12/02/20  .

## 2020-12-03 PROBLEM — I25.10 CORONARY ARTERY DISEASE DUE TO LIPID RICH PLAQUE: Status: ACTIVE | Noted: 2020-12-03

## 2020-12-03 PROBLEM — I25.83 CORONARY ARTERY DISEASE DUE TO LIPID RICH PLAQUE: Status: ACTIVE | Noted: 2020-12-03

## 2020-12-03 RX ORDER — CLOPIDOGREL BISULFATE 75 MG/1
75 TABLET ORAL DAILY
Qty: 90 TABLET | Refills: 3 | Status: SHIPPED | OUTPATIENT
Start: 2020-12-03 | End: 2021-10-21 | Stop reason: SDUPTHER

## 2021-01-05 ENCOUNTER — OFFICE VISIT (OUTPATIENT)
Dept: CARDIOLOGY | Facility: CLINIC | Age: 66
End: 2021-01-05

## 2021-01-05 VITALS
OXYGEN SATURATION: 98 % | HEIGHT: 67 IN | BODY MASS INDEX: 30.92 KG/M2 | SYSTOLIC BLOOD PRESSURE: 128 MMHG | WEIGHT: 197 LBS | RESPIRATION RATE: 18 BRPM | HEART RATE: 80 BPM | DIASTOLIC BLOOD PRESSURE: 78 MMHG

## 2021-01-05 DIAGNOSIS — I25.10 CORONARY ARTERY DISEASE DUE TO LIPID RICH PLAQUE: Chronic | ICD-10-CM

## 2021-01-05 DIAGNOSIS — R07.9 CHEST PAIN DUE TO GERD: Primary | ICD-10-CM

## 2021-01-05 DIAGNOSIS — Z72.0 TOBACCO ABUSE: Chronic | ICD-10-CM

## 2021-01-05 DIAGNOSIS — Z95.1 HISTORY OF CORONARY ARTERY BYPASS GRAFT: Chronic | ICD-10-CM

## 2021-01-05 DIAGNOSIS — I25.83 CORONARY ARTERY DISEASE DUE TO LIPID RICH PLAQUE: Chronic | ICD-10-CM

## 2021-01-05 DIAGNOSIS — I51.89 GRADE II DIASTOLIC DYSFUNCTION: Chronic | ICD-10-CM

## 2021-01-05 DIAGNOSIS — I10 ESSENTIAL HYPERTENSION: Chronic | ICD-10-CM

## 2021-01-05 DIAGNOSIS — K21.9 CHEST PAIN DUE TO GERD: Primary | ICD-10-CM

## 2021-01-05 DIAGNOSIS — E78.5 HYPERLIPIDEMIA LDL GOAL <70: Chronic | ICD-10-CM

## 2021-01-05 PROCEDURE — 99214 OFFICE O/P EST MOD 30 MIN: CPT | Performed by: INTERNAL MEDICINE

## 2021-01-05 PROCEDURE — 93000 ELECTROCARDIOGRAM COMPLETE: CPT | Performed by: INTERNAL MEDICINE

## 2021-01-05 RX ORDER — SUCRALFATE 1 G/1
1 TABLET ORAL 4 TIMES DAILY
Qty: 120 TABLET | Refills: 1 | Status: SHIPPED | OUTPATIENT
Start: 2021-01-05 | End: 2021-03-05 | Stop reason: SDUPTHER

## 2021-01-06 NOTE — PROGRESS NOTES
"Chief Complaint  Chest Pain, Edema, Coronary Artery Disease, Hypertension, and Hyperlipidemia    Subjective    History of Present Illness      Rishi Galicia presents to [facility] for continued cardiovascular care.  He is a pleasant 65-year-old male who observes the CDC guidelines for social distancing.  He does not report any fever or increased shortness of breath and no cough.  He denies any change in his sense of smell or taste.  Mr. Galicia has a history of coronary heart disease with previous revascularization by bypass surgery in 2009 and several percutaneous coronary intervention.  He reports generalized fatigue and has had some chest discomfort.  This is in the low sternal and epigastric region.  It occurred occurred this past Sunday and he states it lasted all day.  There has been no further episodes.  He also reports when he takes clopidogrel he develops chest discomfort in the epigastric and low sternal region.  He has his baseline dyspnea on exertion but no orthopnea or PND no lower extremity edema.  He denies syncope or near syncope.  He does report occasional mild edema.  He has a history of hypertension which is controlled.  He has known hyperlipidemia and is on Repatha and Zetia.  Mr. Galicia has a long history of tobacco abuse which continues at approximately half a pack per day    Objective   Vital Signs:   /78 (BP Location: Left arm, Patient Position: Sitting, Cuff Size: Large Adult)   Pulse 80   Resp 18   Ht 170.2 cm (67\")   Wt 89.4 kg (197 lb)   SpO2 98%   BMI 30.85 kg/m²     Constitutional:       Appearance: Well-developed.   Eyes:      Conjunctiva/sclera: Conjunctivae normal.      Pupils: Pupils are equal, round, and reactive to light.   HENT:      Head: Normocephalic and atraumatic.   Neck:      Musculoskeletal: Neck supple.      Thyroid: No thyromegaly.   Pulmonary:      Effort: Pulmonary effort is normal. No respiratory distress.      Breath " sounds: Normal breath sounds. No wheezing. No rales.   Cardiovascular:      Normal rate. Regular rhythm.      S4 Gallop. No S3 gallop. Midsystolic click click.   Abdominal:      General: Bowel sounds are normal. There is no distension.      Palpations: Abdomen is soft. There is no abdominal mass.      Tenderness: There is no abdominal tenderness.   Musculoskeletal: Normal range of motion.   Skin:     General: Skin is warm and dry.      Findings: No erythema.   Neurological:      Mental Status: Alert and oriented to person, place, and time.         Result Review :   The following data was reviewed by: Yunior Crook MD on 01/05/2021:  Common labs    Common Labsle 10/27/20 10/27/20 11/4/20    1146 1146    BUN  20 25 (A)   Creatinine  1.51 (A) 1.59 (A)   eGFR Non  Am  46 (A) 44 (A)   Sodium  138 141   Potassium  4.1 3.8   Chloride  105 109 (A)   Calcium  9.3 8.7   Albumin  4.60    Total Bilirubin  0.4    Alkaline Phosphatase  55    AST (SGOT)  22    ALT (SGPT)  35    WBC 5.57     Hemoglobin 14.3     Hematocrit 41.7     Platelets 147     (A) Abnormal value            Data reviewed: Cardiology studies Visually reviewed with patient his coronary angiography from 11/3/2020. Echocardiogram from 7/18/2018 reveals preserved left ventricular function, grade 2 diastolic dysfunction, trivial aortic insufficiency and mild mitral tricuspid insufficiency.  Nuclear stress test from 6/4/2019 demonstrated preserved left ventricular function no evidence of ischemia     ECG 12 Lead    Date/Time: 1/5/2021 8:13 PM  Performed by: Yunior Crook MD  Authorized by: Yunior Crook MD   Comparison: compared with previous ECG from 11/4/2020  Comparison to previous ECG: T wave inversion V2 no longer present  Rhythm: sinus rhythm  BPM: 73  Other findings: non-specific ST-T wave changes and left atrial abnormality              Assessment and Plan    1. Chest pain due to GERD  Continue proton pump inhibitor  - sucralfate (Carafate) 1 g  tablet; Take 1 tablet by mouth 4 (Four) Times a Day.  Dispense: 120 tablet; Refill: 1    2. Coronary artery disease due to lipid rich plaque  Stable    3. History of coronary artery bypass graft  Stable    4. Essential hypertension  Controlled    5. Grade II diastolic dysfunction  Presently compensated    6. Tobacco abuse  Encourage to discontinued    7. Hyperlipidemia LDL goal <70  Continue PCSK9 inhibitor in setting    Mr. Galicia reports some low chest and epigastric symptoms of long duration.  There is no clear exertional component.  I will add Carafate 1 g before meals and at bedtime to his medical regimen.  He will contact me within 5 days with an update in his symptoms.  He will take his clopidogrel at nighttime.  Should his chest discomfort not significantly improved on Carafate and proton pump inhibitor we will then pursue ischemic assessment.  If his symptoms persist with clopidogrel we will switch clopidogrel to Effient.  I will plan to see him in follow-up in 4 weeks sooner if needed.        Follow Up   No follow-ups on file.  Patient was given instructions and counseling regarding his condition or for health maintenance advice. Please see specific information pulled into the AVS if appropriate.

## 2021-01-21 RX ORDER — AMLODIPINE BESYLATE 5 MG/1
TABLET ORAL
Qty: 45 TABLET | Refills: 3 | Status: SHIPPED | OUTPATIENT
Start: 2021-01-21 | End: 2021-10-27 | Stop reason: SDUPTHER

## 2021-03-02 ENCOUNTER — APPOINTMENT (OUTPATIENT)
Dept: VACCINE CLINIC | Facility: HOSPITAL | Age: 66
End: 2021-03-02

## 2021-03-05 ENCOUNTER — OFFICE VISIT (OUTPATIENT)
Dept: CARDIOLOGY | Facility: CLINIC | Age: 66
End: 2021-03-05

## 2021-03-05 VITALS
SYSTOLIC BLOOD PRESSURE: 136 MMHG | WEIGHT: 191 LBS | HEIGHT: 67 IN | DIASTOLIC BLOOD PRESSURE: 72 MMHG | HEART RATE: 81 BPM | BODY MASS INDEX: 29.98 KG/M2

## 2021-03-05 DIAGNOSIS — I10 ESSENTIAL HYPERTENSION: Chronic | ICD-10-CM

## 2021-03-05 DIAGNOSIS — N18.1 CKD (CHRONIC KIDNEY DISEASE) STAGE 1, GFR 90 ML/MIN OR GREATER: ICD-10-CM

## 2021-03-05 DIAGNOSIS — I25.10 CORONARY ARTERY DISEASE DUE TO LIPID RICH PLAQUE: Primary | Chronic | ICD-10-CM

## 2021-03-05 DIAGNOSIS — Z72.0 TOBACCO ABUSE: Chronic | ICD-10-CM

## 2021-03-05 DIAGNOSIS — I51.89 GRADE II DIASTOLIC DYSFUNCTION: Chronic | ICD-10-CM

## 2021-03-05 DIAGNOSIS — R42 ORTHOSTATIC DIZZINESS: ICD-10-CM

## 2021-03-05 DIAGNOSIS — R07.9 CHEST PAIN DUE TO GERD: ICD-10-CM

## 2021-03-05 DIAGNOSIS — I25.83 CORONARY ARTERY DISEASE DUE TO LIPID RICH PLAQUE: Primary | Chronic | ICD-10-CM

## 2021-03-05 DIAGNOSIS — I79.8 OTHER DISORDERS OF ARTERIES, ARTERIOLES AND CAPILLARIES IN DISEASES CLASSIFIED ELSEWHERE (HCC): ICD-10-CM

## 2021-03-05 DIAGNOSIS — Z95.1 HISTORY OF CORONARY ARTERY BYPASS GRAFT: Chronic | ICD-10-CM

## 2021-03-05 DIAGNOSIS — E78.5 HYPERLIPIDEMIA LDL GOAL <70: Chronic | ICD-10-CM

## 2021-03-05 DIAGNOSIS — K21.9 CHEST PAIN DUE TO GERD: ICD-10-CM

## 2021-03-05 PROCEDURE — 99214 OFFICE O/P EST MOD 30 MIN: CPT | Performed by: INTERNAL MEDICINE

## 2021-03-05 RX ORDER — SUCRALFATE 1 G/1
1 TABLET ORAL 4 TIMES DAILY
Qty: 120 TABLET | Refills: 1 | Status: SHIPPED | OUTPATIENT
Start: 2021-03-05 | End: 2022-08-02

## 2021-03-05 NOTE — PROGRESS NOTES
"Chief Complaint  Follow-up    Subjective    History of Present Illness      I saw Rishi Galicia today for continued cardiovascular care.  He is a very pleasant 66-year-old male who observes the CDC guidelines during the coronavirus pandemic.  He has received the first injection of the COVID-19 vaccine.  He is free of chest discomfort.  He reported some low sternal epigastric discomfort of long duration.  I added sucralfate to his proton pump inhibitor.  His symptoms have completely resolved.  He is free of any significant or progressive respiratory insufficiency.  He does not report lower extremity edema.  He is free of syncope near syncope or palpitations.  He has reduced his tobacco use from 1 pack a day to half a pack a day and is using Nicorette lozenges.  Echocardiogram reviewed from 7/18/2018 demonstrates preserved left ventricular function trace aortic insufficiency mild mitral tricuspid insufficiency.  Coronary angiography 11/3/2020 demonstrates severe three-vessel native coronary artery disease status post coronary artery bypass surgery and multiple previous percutaneous intervention.  He is status post deployment of a Xience drug-eluting stent 2.5 x 15 to the distal anastomotic site of the saphenous vein graft to the second marginal branch reducing an 80% stenosis to less than 10%.    Objective   Vital Signs:   /72   Pulse 81   Ht 170.2 cm (67\")   Wt 86.6 kg (191 lb)   BMI 29.91 kg/m²     Constitutional:       Appearance: Well-developed.   Eyes:      Conjunctiva/sclera: Conjunctivae normal.      Pupils: Pupils are equal, round, and reactive to light.   HENT:      Head: Normocephalic and atraumatic.   Neck:      Musculoskeletal: Neck supple.      Thyroid: No thyromegaly.   Pulmonary:      Effort: Pulmonary effort is normal. No respiratory distress.      Breath sounds: Normal breath sounds. No wheezing. No rales.   Cardiovascular:      Normal rate. Regular rhythm.      S4 Gallop. No S3 gallop. " Midsystolic click click.   Edema:     Peripheral edema absent.   Abdominal:      General: Bowel sounds are normal. There is no distension.      Palpations: Abdomen is soft. There is no abdominal mass.      Tenderness: There is no abdominal tenderness.   Musculoskeletal: Normal range of motion.   Skin:     General: Skin is warm and dry.      Findings: No erythema.   Neurological:      Mental Status: Alert and oriented to person, place, and time.         Result Review :   The following data was reviewed by: Yunior Crook MD on 03/05/2021:  Common labs    Common Labsle 10/27/20 10/27/20 11/4/20    1146 1146    Glucose  98 75   BUN  20 25 (A)   Creatinine  1.51 (A) 1.59 (A)   eGFR Non  Am  46 (A) 44 (A)   Sodium  138 141   Potassium  4.1 3.8   Chloride  105 109 (A)   Calcium  9.3 8.7   Albumin  4.60    Total Bilirubin  0.4    Alkaline Phosphatase  55    AST (SGOT)  22    ALT (SGPT)  35    WBC 5.57     Hemoglobin 14.3     Hematocrit 41.7     Platelets 147     (A) Abnormal value            Data reviewed: Cardiology studies Echocardiogram 7/18/2018, coronary intervention 11/3/2020          Assessment and Plan    1. Coronary artery disease due to lipid rich plaque  Stable free of angina    2. History of coronary artery bypass graft  Stable    3. Essential hypertension  Controlled    4. Grade II diastolic dysfunction  Compensated    5. Hyperlipidemia LDL goal <70  Continue low-cholesterol low saturated fat diet Zetia and Repatha    6. Tobacco abuse  Continue to reduce and discontinue tobacco use    7. CKD (chronic kidney disease) stage 1, GFR 90 ml/min or greater  Stable    8. Orthostatic dizziness  Resolved    9. Chest pain due to GERD  Resolved  - sucralfate (Carafate) 1 g tablet; Take 1 tablet by mouth 4 (Four) Times a Day.  Dispense: 120 tablet; Refill: 1    Mr. Galicia is stable free of angina and euvolemic on physical examination.  He is aware the point continued risk modification by observing a  low-cholesterol low saturated fat diet and reducing his tobacco use with subsequent discontinuation.  I will assess bilateral carotid ultrasound and see him in follow-up in 3 to 4 months.        Follow Up   No follow-ups on file.  Patient was given instructions and counseling regarding his condition or for health maintenance advice. Please see specific information pulled into the AVS if appropriate.

## 2021-03-16 ENCOUNTER — BULK ORDERING (OUTPATIENT)
Dept: CASE MANAGEMENT | Facility: OTHER | Age: 66
End: 2021-03-16

## 2021-03-16 DIAGNOSIS — Z23 IMMUNIZATION DUE: ICD-10-CM

## 2021-03-26 ENCOUNTER — TELEPHONE (OUTPATIENT)
Dept: CARDIOLOGY | Facility: CLINIC | Age: 66
End: 2021-03-26

## 2021-03-29 PROBLEM — R07.89 OTHER CHEST PAIN: Status: ACTIVE | Noted: 2021-03-29

## 2021-03-30 ENCOUNTER — HOSPITAL ENCOUNTER (OUTPATIENT)
Dept: CARDIOLOGY | Facility: HOSPITAL | Age: 66
Discharge: HOME | End: 2021-03-30
Admitting: INTERNAL MEDICINE

## 2021-03-30 ENCOUNTER — OFFICE VISIT (OUTPATIENT)
Dept: CARDIOLOGY | Facility: CLINIC | Age: 66
End: 2021-03-30

## 2021-03-30 VITALS
BODY MASS INDEX: 30.29 KG/M2 | OXYGEN SATURATION: 97 % | RESPIRATION RATE: 18 BRPM | HEIGHT: 67 IN | WEIGHT: 193 LBS | SYSTOLIC BLOOD PRESSURE: 120 MMHG | DIASTOLIC BLOOD PRESSURE: 78 MMHG | HEART RATE: 83 BPM

## 2021-03-30 DIAGNOSIS — Z72.0 TOBACCO ABUSE: Chronic | ICD-10-CM

## 2021-03-30 DIAGNOSIS — I25.10 CORONARY ARTERY DISEASE DUE TO LIPID RICH PLAQUE: ICD-10-CM

## 2021-03-30 DIAGNOSIS — N18.1 CKD (CHRONIC KIDNEY DISEASE) STAGE 1, GFR 90 ML/MIN OR GREATER: ICD-10-CM

## 2021-03-30 DIAGNOSIS — I25.83 CORONARY ARTERY DISEASE DUE TO LIPID RICH PLAQUE: ICD-10-CM

## 2021-03-30 DIAGNOSIS — I10 ESSENTIAL HYPERTENSION: ICD-10-CM

## 2021-03-30 DIAGNOSIS — I79.8 OTHER DISORDERS OF ARTERIES, ARTERIOLES AND CAPILLARIES IN DISEASES CLASSIFIED ELSEWHERE (HCC): ICD-10-CM

## 2021-03-30 DIAGNOSIS — E78.5 HYPERLIPIDEMIA LDL GOAL <70: ICD-10-CM

## 2021-03-30 DIAGNOSIS — Z95.1 HISTORY OF CORONARY ARTERY BYPASS GRAFT: Chronic | ICD-10-CM

## 2021-03-30 DIAGNOSIS — Z72.0 TOBACCO ABUSE: ICD-10-CM

## 2021-03-30 DIAGNOSIS — I51.89 GRADE II DIASTOLIC DYSFUNCTION: Chronic | ICD-10-CM

## 2021-03-30 DIAGNOSIS — R07.89 OTHER CHEST PAIN: Primary | ICD-10-CM

## 2021-03-30 DIAGNOSIS — Z95.1 HISTORY OF CORONARY ARTERY BYPASS GRAFT: ICD-10-CM

## 2021-03-30 PROCEDURE — 93000 ELECTROCARDIOGRAM COMPLETE: CPT | Performed by: INTERNAL MEDICINE

## 2021-03-30 PROCEDURE — 93880 EXTRACRANIAL BILAT STUDY: CPT | Performed by: INTERNAL MEDICINE

## 2021-03-30 PROCEDURE — 93880 EXTRACRANIAL BILAT STUDY: CPT

## 2021-03-30 PROCEDURE — 99215 OFFICE O/P EST HI 40 MIN: CPT | Performed by: INTERNAL MEDICINE

## 2021-03-30 RX ORDER — ALFUZOSIN HYDROCHLORIDE 10 MG/1
10 TABLET, EXTENDED RELEASE ORAL NIGHTLY
COMMUNITY

## 2021-03-30 NOTE — PROGRESS NOTES
Chief Complaint  Chest Pain, Coronary Artery Disease, and Heartburn    Subjective    History of Present Illness      I saw Rishi Galicia today for continued cardiovascular care.  He is a very pleasant 66-year-old male who observes the CDC guidelines during the coronavirus pandemic.  He has received his COVID-19 vaccine.  Rishi has a long history of coronary heart disease.  He has undergone previous coronary artery bypass surgery as well as multiple previous percutaneous coronary interventions.  He reports some chest discomfort that occurred at rest at the time of his last visit 3/5/2021.  I added Carafate to his medical regimen and his symptoms temporarily resolved.  He states that his low sternal chest discomfort has returned.  There are some located the left chest as well.  This occurs with exertion and seems to be better at rest.  There is associated shortness of breath.  Symptoms occur on a daily basis.  Over the last 2 weeks he has noticed some decrease in the frequency.  He denies orthopnea or PND there is no syncope near syncope or palpitations.  Unfortunately continues to smoke about 1 pack of cigarettes daily.    Objective   Vital Signs:   There were no vitals taken for this visit.    Constitutional:       Appearance: Well-developed.   Eyes:      Conjunctiva/sclera: Conjunctivae normal.      Pupils: Pupils are equal, round, and reactive to light.   HENT:      Head: Normocephalic and atraumatic.   Neck:      Thyroid: No thyromegaly.   Pulmonary:      Effort: Pulmonary effort is normal. No respiratory distress.      Breath sounds: Normal breath sounds. No wheezing. No rales.   Cardiovascular:      Normal rate. Regular rhythm.      S4 Gallop. No S3 gallop. Midsystolic click click.   Edema:     Peripheral edema absent.   Abdominal:      General: Bowel sounds are normal. There is no distension.      Palpations: Abdomen is soft. There is no abdominal mass.      Tenderness: There is no abdominal tenderness.    Musculoskeletal: Normal range of motion.      Cervical back: Neck supple. Skin:     General: Skin is warm and dry.      Findings: No erythema.   Neurological:      Mental Status: Alert and oriented to person, place, and time.         Result Review :     Common labs    Common Labsle 10/27/20 10/27/20 11/4/20    1146 1146    Glucose  98 75   BUN  20 25 (A)   Creatinine  1.51 (A) 1.59 (A)   eGFR Non  Am  46 (A) 44 (A)   Sodium  138 141   Potassium  4.1 3.8   Chloride  105 109 (A)   Calcium  9.3 8.7   Albumin  4.60    Total Bilirubin  0.4    Alkaline Phosphatase  55    AST (SGOT)  22    ALT (SGPT)  35    WBC 5.57     Hemoglobin 14.3     Hematocrit 41.7     Platelets 147     (A) Abnormal value            Data reviewed: Cardiology studies Review of coronary angiography from 11/3/2020 demonstrates severe three-vessel coronary heart disease status post bypass surgery.  There is preserved left ventricular function by left ventriculogram.  He underwent successful deployment of a Xience drug-eluting stent 2.5 x 15 to the distal anastomotic site of the saphenous vein graft to OM2 reducing an 80% stenosis to less than 10%.  Review of echocardiogram from 7/18/2018 demonstrates preserved left ventricular function, grade 2 diastolic dysfunction, trace aortic insufficiency and mild mitral and tricuspid insufficiency.     ECG 12 Lead    Date/Time: 3/30/2021 5:48 PM  Performed by: Yunior Crook MD  Authorized by: Yunior Crook MD   Comparison: compared with previous ECG from 1/5/2021  Similar to previous ECG  Rhythm: sinus rhythm  BPM: 75  Other findings: left atrial abnormality              Assessment and Plan    1.  Exertional chest pain  Suggestive of angina    2. Coronary artery disease due to lipid rich plaque  Stable angina    3. History of coronary artery bypass graft  Stable angina    4. Essential hypertension  Controlled    5. Grade II diastolic dysfunction  Compensated    6. Hyperlipidemia LDL goal  <70  Continue low-cholesterol low saturated fat diet Zetia 10 mg daily and Repatha PCSK9 inhibitor    7. Tobacco abuse  Encouraged to discontinue    8. CKD (chronic kidney disease) stage 1, GFR 90 ml/min or greater  Stable     I had a long discussion with Mr. Galicia today.  I counseled him on the importance of risk modification discontinue tobacco use completely.  I encouraged him to continue low-cholesterol low saturated fat diet and his current medical regimen.  We discussed potential further evaluation of his ischemic heart disease with either nuclear stress testing or coronary angiography.  He does not wish to consider either at the present time.  I will therefore advance his isosorbide mononitrate from 60 to 90 mg daily.  He will monitor his symptoms and communicate with me over the next week as to the persistence of his chest discomfort.  I have encouraged him to contact me immediately if he sees any progression.  He understands if there is persistence or progression of his anginal type symptoms we will need to proceed to coronary angiography.  I will plan to see him in follow-up in 4 weeks to reassess.  I spent 50 minutes caring for Rishi on this date of service. This time includes time spent by me in the following activities:preparing for the visit, reviewing tests, performing a medically appropriate examination and/or evaluation , counseling and educating the patient/family/caregiver, ordering medications, tests, or procedures, documenting information in the medical record, independently interpreting results and communicating that information with the patient/family/caregiver and care coordination  Follow Up   No follow-ups on file.  Patient was given instructions and counseling regarding his condition or for health maintenance advice. Please see specific information pulled into the AVS if appropriate.

## 2021-03-31 LAB
BH CV XLRA MEAS LEFT DIST CCA EDV: 22.3 CM/SEC
BH CV XLRA MEAS LEFT DIST CCA PSV: 85.2 CM/SEC
BH CV XLRA MEAS LEFT DIST ICA EDV: -37 CM/SEC
BH CV XLRA MEAS LEFT DIST ICA PSV: -77.1 CM/SEC
BH CV XLRA MEAS LEFT ICA/CCA RATIO: 1
BH CV XLRA MEAS LEFT MID CCA EDV: 31 CM/SEC
BH CV XLRA MEAS LEFT MID CCA PSV: 90.1 CM/SEC
BH CV XLRA MEAS LEFT MID ICA EDV: -40.1 CM/SEC
BH CV XLRA MEAS LEFT MID ICA PSV: -84.2 CM/SEC
BH CV XLRA MEAS LEFT PROX CCA EDV: 31 CM/SEC
BH CV XLRA MEAS LEFT PROX CCA PSV: 107 CM/SEC
BH CV XLRA MEAS LEFT PROX ECA PSV: -124 CM/SEC
BH CV XLRA MEAS LEFT PROX ICA EDV: -32 CM/SEC
BH CV XLRA MEAS LEFT PROX ICA PSV: -83.3 CM/SEC
BH CV XLRA MEAS LEFT PROX SCLA PSV: 92.9 CM/SEC
BH CV XLRA MEAS LEFT VERTEBRAL A PSV: -32.2 CM/SEC
BH CV XLRA MEAS RIGHT DIST CCA EDV: 17.4 CM/SEC
BH CV XLRA MEAS RIGHT DIST CCA PSV: 56.5 CM/SEC
BH CV XLRA MEAS RIGHT DIST ICA EDV: -17.7 CM/SEC
BH CV XLRA MEAS RIGHT DIST ICA PSV: -43.9 CM/SEC
BH CV XLRA MEAS RIGHT ICA/CCA RATIO: 1
BH CV XLRA MEAS RIGHT MID CCA EDV: 21.7 CM/SEC
BH CV XLRA MEAS RIGHT MID CCA PSV: 67.7 CM/SEC
BH CV XLRA MEAS RIGHT MID ICA EDV: -23.7 CM/SEC
BH CV XLRA MEAS RIGHT MID ICA PSV: -52 CM/SEC
BH CV XLRA MEAS RIGHT PROX CCA EDV: 16.8 CM/SEC
BH CV XLRA MEAS RIGHT PROX CCA PSV: 75.2 CM/SEC
BH CV XLRA MEAS RIGHT PROX ECA PSV: -184 CM/SEC
BH CV XLRA MEAS RIGHT PROX ICA EDV: -21.2 CM/SEC
BH CV XLRA MEAS RIGHT PROX ICA PSV: -45.9 CM/SEC
BH CV XLRA MEAS RIGHT PROX SCLA PSV: 112 CM/SEC
BH CV XLRA MEAS RIGHT VERTEBRAL A PSV: -49.7 CM/SEC

## 2021-04-27 DIAGNOSIS — K21.9 GASTROESOPHAGEAL REFLUX DISEASE WITHOUT ESOPHAGITIS: ICD-10-CM

## 2021-04-27 RX ORDER — PANTOPRAZOLE SODIUM 40 MG/1
40 TABLET, DELAYED RELEASE ORAL DAILY
Qty: 90 TABLET | Refills: 3 | Status: SHIPPED | OUTPATIENT
Start: 2021-04-27 | End: 2021-10-27 | Stop reason: SDUPTHER

## 2021-04-28 ENCOUNTER — TELEPHONE (OUTPATIENT)
Dept: CARDIOLOGY | Facility: CLINIC | Age: 66
End: 2021-04-28

## 2021-04-28 DIAGNOSIS — T50.8X5A ALLERGIC REACTION TO CONTRAST MATERIAL, INITIAL ENCOUNTER: Primary | ICD-10-CM

## 2021-04-28 RX ORDER — PREDNISONE 50 MG/1
TABLET ORAL
Qty: 3 TABLET | Refills: 0 | Status: SHIPPED | OUTPATIENT
Start: 2021-04-28 | End: 2021-09-24

## 2021-04-28 RX ORDER — CIMETIDINE 400 MG/1
TABLET, FILM COATED ORAL
Qty: 1 TABLET | Refills: 0 | Status: CANCELLED | OUTPATIENT
Start: 2021-04-28

## 2021-04-28 RX ORDER — CIMETIDINE 400 MG/1
TABLET, FILM COATED ORAL
Qty: 1 TABLET | Refills: 0 | Status: SHIPPED | OUTPATIENT
Start: 2021-04-28 | End: 2021-09-24

## 2021-04-28 RX ORDER — PREDNISONE 50 MG/1
TABLET ORAL
Qty: 3 TABLET | Refills: 0 | Status: CANCELLED | OUTPATIENT
Start: 2021-04-28

## 2021-04-28 NOTE — TELEPHONE ENCOUNTER
Patient is scheduled for a heart cath on 05/04/2021 and has a dye contrast allergy, would like dye allergy medication sent to [facility].

## 2021-04-29 ENCOUNTER — LAB (OUTPATIENT)
Dept: LAB | Facility: HOSPITAL | Age: 66
End: 2021-04-29

## 2021-04-29 DIAGNOSIS — Z01.818 PRE-OP TESTING: ICD-10-CM

## 2021-04-29 DIAGNOSIS — I25.83 CORONARY ARTERY DISEASE DUE TO LIPID RICH PLAQUE: ICD-10-CM

## 2021-04-29 DIAGNOSIS — R07.89 OTHER CHEST PAIN: ICD-10-CM

## 2021-04-29 DIAGNOSIS — I25.10 CORONARY ARTERY DISEASE DUE TO LIPID RICH PLAQUE: ICD-10-CM

## 2021-04-29 LAB
ALBUMIN SERPL-MCNC: 4.3 G/DL (ref 3.5–5.2)
ALBUMIN/GLOB SERPL: 1.6 G/DL
ALP SERPL-CCNC: 60 U/L (ref 39–117)
ALT SERPL W P-5'-P-CCNC: 22 U/L (ref 1–41)
ANION GAP SERPL CALCULATED.3IONS-SCNC: 13.6 MMOL/L (ref 5–15)
AST SERPL-CCNC: 16 U/L (ref 1–40)
BASOPHILS # BLD AUTO: 0.05 10*3/MM3 (ref 0–0.2)
BASOPHILS NFR BLD AUTO: 0.9 % (ref 0–1.5)
BILIRUB SERPL-MCNC: 0.2 MG/DL (ref 0–1.2)
BUN SERPL-MCNC: 14 MG/DL (ref 8–23)
BUN/CREAT SERPL: 10.3 (ref 7–25)
CALCIUM SPEC-SCNC: 9 MG/DL (ref 8.6–10.5)
CHLORIDE SERPL-SCNC: 107 MMOL/L (ref 98–107)
CO2 SERPL-SCNC: 21.4 MMOL/L (ref 22–29)
CREAT SERPL-MCNC: 1.36 MG/DL (ref 0.76–1.27)
DEPRECATED RDW RBC AUTO: 43.2 FL (ref 37–54)
EOSINOPHIL # BLD AUTO: 0.21 10*3/MM3 (ref 0–0.4)
EOSINOPHIL NFR BLD AUTO: 3.6 % (ref 0.3–6.2)
ERYTHROCYTE [DISTWIDTH] IN BLOOD BY AUTOMATED COUNT: 13.5 % (ref 12.3–15.4)
GFR SERPL CREATININE-BSD FRML MDRD: 52 ML/MIN/1.73
GLOBULIN UR ELPH-MCNC: 2.7 GM/DL
GLUCOSE SERPL-MCNC: 108 MG/DL (ref 65–99)
HCT VFR BLD AUTO: 43.3 % (ref 37.5–51)
HGB BLD-MCNC: 14.5 G/DL (ref 13–17.7)
IMM GRANULOCYTES # BLD AUTO: 0.03 10*3/MM3 (ref 0–0.05)
IMM GRANULOCYTES NFR BLD AUTO: 0.5 % (ref 0–0.5)
INR PPP: 1.11 (ref 0.9–1.1)
LYMPHOCYTES # BLD AUTO: 2.01 10*3/MM3 (ref 0.7–3.1)
LYMPHOCYTES NFR BLD AUTO: 34.8 % (ref 19.6–45.3)
MCH RBC QN AUTO: 29.8 PG (ref 26.6–33)
MCHC RBC AUTO-ENTMCNC: 33.5 G/DL (ref 31.5–35.7)
MCV RBC AUTO: 88.9 FL (ref 79–97)
MONOCYTES # BLD AUTO: 0.49 10*3/MM3 (ref 0.1–0.9)
MONOCYTES NFR BLD AUTO: 8.5 % (ref 5–12)
NEUTROPHILS NFR BLD AUTO: 2.98 10*3/MM3 (ref 1.7–7)
NEUTROPHILS NFR BLD AUTO: 51.7 % (ref 42.7–76)
NRBC BLD AUTO-RTO: 0 /100 WBC (ref 0–0.2)
PLATELET # BLD AUTO: 149 10*3/MM3 (ref 140–450)
PMV BLD AUTO: 11.8 FL (ref 6–12)
POTASSIUM SERPL-SCNC: 4 MMOL/L (ref 3.5–5.2)
PROT SERPL-MCNC: 7 G/DL (ref 6–8.5)
PROTHROMBIN TIME: 14.1 SECONDS (ref 11.7–14.2)
RBC # BLD AUTO: 4.87 10*6/MM3 (ref 4.14–5.8)
SODIUM SERPL-SCNC: 142 MMOL/L (ref 136–145)
WBC # BLD AUTO: 5.77 10*3/MM3 (ref 3.4–10.8)

## 2021-04-29 PROCEDURE — 80053 COMPREHEN METABOLIC PANEL: CPT

## 2021-04-29 PROCEDURE — 85025 COMPLETE CBC W/AUTO DIFF WBC: CPT

## 2021-04-29 PROCEDURE — 36415 COLL VENOUS BLD VENIPUNCTURE: CPT

## 2021-04-29 PROCEDURE — 85610 PROTHROMBIN TIME: CPT

## 2021-04-30 ENCOUNTER — TRANSCRIBE ORDERS (OUTPATIENT)
Dept: SLEEP MEDICINE | Facility: HOSPITAL | Age: 66
End: 2021-04-30

## 2021-04-30 DIAGNOSIS — Z01.818 OTHER SPECIFIED PRE-OPERATIVE EXAMINATION: Primary | ICD-10-CM

## 2021-05-01 ENCOUNTER — LAB REQUISITION (OUTPATIENT)
Dept: LAB | Facility: HOSPITAL | Age: 66
End: 2021-05-01

## 2021-05-01 DIAGNOSIS — Z00.00 ENCOUNTER FOR GENERAL ADULT MEDICAL EXAMINATION WITHOUT ABNORMAL FINDINGS: ICD-10-CM

## 2021-05-01 PROCEDURE — U0004 COV-19 TEST NON-CDC HGH THRU: HCPCS | Performed by: INTERNAL MEDICINE

## 2021-05-01 PROCEDURE — C9803 HOPD COVID-19 SPEC COLLECT: HCPCS | Performed by: INTERNAL MEDICINE

## 2021-05-02 LAB — SARS-COV-2 ORF1AB RESP QL NAA+PROBE: NOT DETECTED

## 2021-05-04 ENCOUNTER — HOSPITAL ENCOUNTER (OUTPATIENT)
Facility: HOSPITAL | Age: 66
Setting detail: HOSPITAL OUTPATIENT SURGERY
Discharge: HOME | End: 2021-05-04
Attending: INTERNAL MEDICINE | Admitting: INTERNAL MEDICINE

## 2021-05-04 VITALS
OXYGEN SATURATION: 96 % | RESPIRATION RATE: 18 BRPM | WEIGHT: 195 LBS | SYSTOLIC BLOOD PRESSURE: 125 MMHG | BODY MASS INDEX: 30.61 KG/M2 | DIASTOLIC BLOOD PRESSURE: 71 MMHG | HEART RATE: 69 BPM | HEIGHT: 67 IN | TEMPERATURE: 98.2 F

## 2021-05-04 DIAGNOSIS — I25.83 CORONARY ARTERY DISEASE DUE TO LIPID RICH PLAQUE: ICD-10-CM

## 2021-05-04 DIAGNOSIS — R07.89 OTHER CHEST PAIN: ICD-10-CM

## 2021-05-04 DIAGNOSIS — I25.10 CORONARY ARTERY DISEASE DUE TO LIPID RICH PLAQUE: ICD-10-CM

## 2021-05-04 PROCEDURE — 0 IOPAMIDOL PER 1 ML: Performed by: INTERNAL MEDICINE

## 2021-05-04 PROCEDURE — 99152 MOD SED SAME PHYS/QHP 5/>YRS: CPT | Performed by: INTERNAL MEDICINE

## 2021-05-04 PROCEDURE — C1894 INTRO/SHEATH, NON-LASER: HCPCS | Performed by: INTERNAL MEDICINE

## 2021-05-04 PROCEDURE — 25010000002 FENTANYL CITRATE (PF) 100 MCG/2ML SOLUTION: Performed by: INTERNAL MEDICINE

## 2021-05-04 PROCEDURE — C1769 GUIDE WIRE: HCPCS | Performed by: INTERNAL MEDICINE

## 2021-05-04 PROCEDURE — 25010000002 MIDAZOLAM PER 1 MG: Performed by: INTERNAL MEDICINE

## 2021-05-04 PROCEDURE — C1760 CLOSURE DEV, VASC: HCPCS | Performed by: INTERNAL MEDICINE

## 2021-05-04 PROCEDURE — 93459 L HRT ART/GRFT ANGIO: CPT | Performed by: INTERNAL MEDICINE

## 2021-05-04 RX ORDER — LIDOCAINE HYDROCHLORIDE 20 MG/ML
INJECTION, SOLUTION INFILTRATION; PERINEURAL AS NEEDED
Status: DISCONTINUED | OUTPATIENT
Start: 2021-05-04 | End: 2021-05-04 | Stop reason: HOSPADM

## 2021-05-04 RX ORDER — ACETAMINOPHEN 325 MG/1
650 TABLET ORAL EVERY 4 HOURS PRN
Status: DISCONTINUED | OUTPATIENT
Start: 2021-05-04 | End: 2021-05-04 | Stop reason: HOSPADM

## 2021-05-04 RX ORDER — SODIUM CHLORIDE 9 MG/ML
75 INJECTION, SOLUTION INTRAVENOUS CONTINUOUS
Status: DISCONTINUED | OUTPATIENT
Start: 2021-05-04 | End: 2021-05-04 | Stop reason: HOSPADM

## 2021-05-04 RX ORDER — FENTANYL CITRATE 50 UG/ML
INJECTION, SOLUTION INTRAMUSCULAR; INTRAVENOUS AS NEEDED
Status: DISCONTINUED | OUTPATIENT
Start: 2021-05-04 | End: 2021-05-04 | Stop reason: HOSPADM

## 2021-05-04 RX ORDER — FLUTICASONE PROPIONATE 50 MCG
2 SPRAY, SUSPENSION (ML) NASAL DAILY
COMMUNITY
End: 2022-03-30

## 2021-05-04 RX ORDER — SODIUM CHLORIDE 0.9 % (FLUSH) 0.9 %
3 SYRINGE (ML) INJECTION EVERY 12 HOURS SCHEDULED
Status: DISCONTINUED | OUTPATIENT
Start: 2021-05-04 | End: 2021-05-04 | Stop reason: HOSPADM

## 2021-05-04 RX ORDER — MIDAZOLAM HYDROCHLORIDE 1 MG/ML
INJECTION INTRAMUSCULAR; INTRAVENOUS AS NEEDED
Status: DISCONTINUED | OUTPATIENT
Start: 2021-05-04 | End: 2021-05-04 | Stop reason: HOSPADM

## 2021-05-04 RX ORDER — SODIUM CHLORIDE 9 MG/ML
100 INJECTION, SOLUTION INTRAVENOUS CONTINUOUS
Status: DISCONTINUED | OUTPATIENT
Start: 2021-05-04 | End: 2021-05-04 | Stop reason: HOSPADM

## 2021-05-04 RX ORDER — SODIUM CHLORIDE 0.9 % (FLUSH) 0.9 %
10 SYRINGE (ML) INJECTION AS NEEDED
Status: DISCONTINUED | OUTPATIENT
Start: 2021-05-04 | End: 2021-05-04 | Stop reason: HOSPADM

## 2021-05-04 RX ORDER — LIDOCAINE HYDROCHLORIDE 10 MG/ML
0.1 INJECTION, SOLUTION EPIDURAL; INFILTRATION; INTRACAUDAL; PERINEURAL ONCE AS NEEDED
Status: DISCONTINUED | OUTPATIENT
Start: 2021-05-04 | End: 2021-05-04 | Stop reason: HOSPADM

## 2021-05-04 RX ORDER — ESZOPICLONE 3 MG/1
3 TABLET, FILM COATED ORAL NIGHTLY
COMMUNITY
End: 2021-09-24 | Stop reason: DRUGHIGH

## 2021-05-04 RX ORDER — MECLIZINE HYDROCHLORIDE 25 MG/1
25 TABLET ORAL 3 TIMES DAILY PRN
COMMUNITY
End: 2022-03-30

## 2021-05-04 RX ADMIN — SODIUM CHLORIDE 75 ML/HR: 9 INJECTION, SOLUTION INTRAVENOUS at 09:07

## 2021-05-28 ENCOUNTER — OFFICE VISIT (OUTPATIENT)
Dept: CARDIOLOGY | Facility: CLINIC | Age: 66
End: 2021-05-28

## 2021-05-28 VITALS
HEART RATE: 82 BPM | BODY MASS INDEX: 30.45 KG/M2 | OXYGEN SATURATION: 95 % | DIASTOLIC BLOOD PRESSURE: 80 MMHG | RESPIRATION RATE: 18 BRPM | WEIGHT: 194 LBS | SYSTOLIC BLOOD PRESSURE: 126 MMHG | HEIGHT: 67 IN

## 2021-05-28 DIAGNOSIS — I51.89 GRADE II DIASTOLIC DYSFUNCTION: Chronic | ICD-10-CM

## 2021-05-28 DIAGNOSIS — E78.5 HYPERLIPIDEMIA LDL GOAL <70: Chronic | ICD-10-CM

## 2021-05-28 DIAGNOSIS — I25.10 CORONARY ARTERY DISEASE DUE TO LIPID RICH PLAQUE: Primary | Chronic | ICD-10-CM

## 2021-05-28 DIAGNOSIS — I10 ESSENTIAL HYPERTENSION: Chronic | ICD-10-CM

## 2021-05-28 DIAGNOSIS — Z72.0 TOBACCO ABUSE: Chronic | ICD-10-CM

## 2021-05-28 DIAGNOSIS — Z95.1 HISTORY OF CORONARY ARTERY BYPASS GRAFT: Chronic | ICD-10-CM

## 2021-05-28 DIAGNOSIS — I25.83 CORONARY ARTERY DISEASE DUE TO LIPID RICH PLAQUE: Primary | Chronic | ICD-10-CM

## 2021-05-28 PROCEDURE — 99214 OFFICE O/P EST MOD 30 MIN: CPT | Performed by: INTERNAL MEDICINE

## 2021-05-28 NOTE — PROGRESS NOTES
"Chief Complaint  Coronary Artery Disease    Subjective    History of Present Illness      I saw Rishi Galicia today for continued cardiovascular care.  He is a pleasant 66-year-old male with history of coronary heart disease status post previous coronary artery bypass surgery as well as multiple previous percutaneous coronary intervention.  He recently underwent coronary angiography 5/4/2021.  Review of this coronary angiography and left ventriculogram revealed normal left heart filling pressures with preserved left ventricular function, patent LIMA to the LAD and no significant disease in the LAD system.  Patent saphenous vein graft to the marginal branch and patent stent in the native vessel.  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  Continue medical treatment.Review of carotid ultrasound from 3/30/2021 demonstrates no significant stenosis in either the right or left internal carotid artery.  Mr. Galicia is free of chest pain pressure or tightness.  His respiratory status is stable.  He denies orthopnea or PND no lower extremity edema.  He is free of syncope near syncope or palpitation he is trying to reduce his tobacco use which currently is at approximately 10 cigarettes/day.    Objective   Vital Signs:   /80 (BP Location: Left arm, Patient Position: Sitting, Cuff Size: Adult)   Pulse 82   Resp 18   Ht 170.2 cm (67\")   Wt 88 kg (194 lb)   SpO2 95%   BMI 30.38 kg/m²     Constitutional:       Appearance: Well-developed.   Eyes:      Conjunctiva/sclera: Conjunctivae normal.      Pupils: Pupils are equal, round, and reactive to light.   HENT:      Head: Normocephalic and atraumatic.   Neck:      Thyroid: No thyromegaly.   Pulmonary:      Effort: Pulmonary effort is normal. No respiratory distress.      Breath sounds: Normal breath sounds. No wheezing. No rales.   Cardiovascular:      Normal rate. Regular rhythm.      S4 Gallop. No S3 gallop. Midsystolic click click.   Edema:     " Peripheral edema absent.   Abdominal:      General: Bowel sounds are normal. There is no distension.      Palpations: Abdomen is soft. There is no abdominal mass.      Tenderness: There is no abdominal tenderness.   Musculoskeletal: Normal range of motion.      Cervical back: Neck supple. Skin:     General: Skin is warm and dry.      Findings: No erythema.   Neurological:      Mental Status: Alert and oriented to person, place, and time.         Result Review :     Common labs    Common Labsle 10/27/20 10/27/20 11/4/20 4/29/21 4/29/21    1146 1146  0907 0907   Glucose  98 75  108 (A)   BUN  20 25 (A)  14   Creatinine  1.51 (A) 1.59 (A)  1.36 (A)   eGFR Non  Am  46 (A) 44 (A)  52 (A)   Sodium  138 141  142   Potassium  4.1 3.8  4.0   Chloride  105 109 (A)  107   Calcium  9.3 8.7  9.0   Albumin  4.60   4.30   Total Bilirubin  0.4   0.2   Alkaline Phosphatase  55   60   AST (SGOT)  22   16   ALT (SGPT)  35   22   WBC 5.57   5.77    Hemoglobin 14.3   14.5    Hematocrit 41.7   43.3    Platelets 147   149    (A) Abnormal value          Fasting lipid profile from 3/10/2021 reveals triglycerides 131, HDL 50 and LDL 98            Assessment and Plan    1. Coronary artery disease due to lipid rich plaque  Stable    2. History of coronary artery bypass graft  Stable    3. Essential hypertension  Controlled    4. Grade II diastolic dysfunction  Compensated    5. Hyperlipidemia LDL goal <70  Continue low-cholesterol low saturated fat diet and PCSK9 inhibitor    6. Tobacco abuse  Encouraged to reduce and discontinue Rishi is stable from cardiovascular standpoint.  He is free of angina and euvolemic.  He tolerates his current medical regimen well.  I encouraged him to pursue risk modification primarily with the discontinuation of tobacco use, continue his low-cholesterol low saturated fat diet and remain aerobically active.  I will see him in follow-up in 3 months sooner if needed.    Follow Up   No follow-ups on  file.  Patient was given instructions and counseling regarding his condition or for health maintenance advice. Please see specific information pulled into the AVS if appropriate.

## 2021-09-24 ENCOUNTER — OFFICE VISIT (OUTPATIENT)
Dept: CARDIOLOGY | Facility: CLINIC | Age: 66
End: 2021-09-24

## 2021-09-24 VITALS
DIASTOLIC BLOOD PRESSURE: 74 MMHG | BODY MASS INDEX: 29.19 KG/M2 | SYSTOLIC BLOOD PRESSURE: 118 MMHG | HEIGHT: 67 IN | WEIGHT: 186 LBS | HEART RATE: 88 BPM

## 2021-09-24 DIAGNOSIS — I10 ESSENTIAL HYPERTENSION: ICD-10-CM

## 2021-09-24 DIAGNOSIS — I51.89 GRADE II DIASTOLIC DYSFUNCTION: ICD-10-CM

## 2021-09-24 DIAGNOSIS — I25.83 CORONARY ARTERY DISEASE DUE TO LIPID RICH PLAQUE: Primary | ICD-10-CM

## 2021-09-24 DIAGNOSIS — I25.10 CORONARY ARTERY DISEASE DUE TO LIPID RICH PLAQUE: Primary | ICD-10-CM

## 2021-09-24 DIAGNOSIS — Z95.1 HISTORY OF CORONARY ARTERY BYPASS GRAFT: ICD-10-CM

## 2021-09-24 DIAGNOSIS — E78.5 HYPERLIPIDEMIA LDL GOAL <70: ICD-10-CM

## 2021-09-24 DIAGNOSIS — Z72.0 TOBACCO ABUSE: ICD-10-CM

## 2021-09-24 PROCEDURE — 99214 OFFICE O/P EST MOD 30 MIN: CPT | Performed by: INTERNAL MEDICINE

## 2021-09-24 RX ORDER — LEVOTHYROXINE SODIUM 137 UG/1
1 TABLET ORAL SEE ADMIN INSTRUCTIONS
COMMUNITY
Start: 2021-06-01

## 2021-09-24 NOTE — PROGRESS NOTES
Chief Complaint  No chief complaint on file.    Subjective    History of Present Illness      I saw Rishi Galicia today for continued cardiovascular care.  He is a very pleasant 66-year-old male with known coronary heart disease.  He is undergone previous coronary artery bypass surgery and multiple percutaneous coronary interventions.  He enjoys a good activity level and is free of angina.  He has his baseline dyspnea on exertion but this is not progressive.  Unfortunately continues to smoke and it is up to 1 pack of cigarettes daily.  He denies orthopnea or PND and no lower extremity edema.  He is free of syncope near syncope or palpitation.  He is blood pressure is well controlled.  He has known hyperlipidemia and is on Repatha, PCSK9 inhibitor.  His lipids remain well controlled.    Objective   Vital Signs:   There were no vitals taken for this visit.    Constitutional:       Appearance: Well-developed.   Eyes:      Conjunctiva/sclera: Conjunctivae normal.      Pupils: Pupils are equal, round, and reactive to light.   HENT:      Head: Normocephalic and atraumatic.   Neck:      Thyroid: No thyromegaly.   Pulmonary:      Effort: Pulmonary effort is normal. No respiratory distress.      Breath sounds: Normal breath sounds. No wheezing. No rales.   Cardiovascular:      Normal rate. Regular rhythm.      S4 Gallop. No S3 gallop. No rub.   Edema:     Peripheral edema absent.   Abdominal:      General: Bowel sounds are normal. There is no distension.      Palpations: Abdomen is soft. There is no abdominal mass.      Tenderness: There is no abdominal tenderness.   Musculoskeletal: Normal range of motion.      Cervical back: Neck supple. Skin:     General: Skin is warm and dry.      Findings: No erythema.   Neurological:      Mental Status: Alert and oriented to person, place, and time.         Result Review :     Common labs    Common Labsle 10/27/20 10/27/20 11/4/20 4/29/21 4/29/21    1146 1146  0907 0907   Glucose   98 75  108 (A)   BUN  20 25 (A)  14   Creatinine  1.51 (A) 1.59 (A)  1.36 (A)   eGFR Non  Am  46 (A) 44 (A)  52 (A)   Sodium  138 141  142   Potassium  4.1 3.8  4.0   Chloride  105 109 (A)  107   Calcium  9.3 8.7  9.0   Albumin  4.60   4.30   Total Bilirubin  0.4   0.2   Alkaline Phosphatase  55   60   AST (SGOT)  22   16   ALT (SGPT)  35   22   WBC 5.57   5.77    Hemoglobin 14.3   14.5    Hematocrit 41.7   43.3    Platelets 147   149    (A) Abnormal value                      Assessment and Plan    1. Coronary artery disease due to lipid rich plaque  Stable    2. History of coronary artery bypass graft  Stable    3. Essential hypertension  Controlled    4. Grade II diastolic dysfunction  Compensated    5. Hyperlipidemia LDL goal <70  Controlled    6. Tobacco abuse  Continues at 1 pack/day    Rishi is free of angina and euvolemic on physical examination.  He tolerates his medications well without reported side effects.  I have counseled him on continued risk modification specifically the reduction and discontinuation of tobacco use.  I have encouraged him to continue a low-cholesterol low saturated fat diet and remain aerobically active.  I will see him in follow-up in 6 months sooner if needed.        Follow Up   No follow-ups on file.  Patient was given instructions and counseling regarding his condition or for health maintenance advice. Please see specific information pulled into the AVS if appropriate.

## 2021-10-21 RX ORDER — CLOPIDOGREL BISULFATE 75 MG/1
75 TABLET ORAL DAILY
Qty: 90 TABLET | Refills: 3 | Status: SHIPPED | OUTPATIENT
Start: 2021-10-21 | End: 2021-10-28

## 2021-10-21 RX ORDER — LOSARTAN POTASSIUM 50 MG/1
25 TABLET ORAL DAILY
Qty: 45 TABLET | Refills: 3 | Status: SHIPPED | OUTPATIENT
Start: 2021-10-21 | End: 2022-08-10

## 2021-10-21 RX ORDER — ISOSORBIDE MONONITRATE 60 MG/1
90 TABLET, EXTENDED RELEASE ORAL DAILY
Qty: 135 TABLET | Refills: 3 | Status: SHIPPED | OUTPATIENT
Start: 2021-10-21 | End: 2021-10-27 | Stop reason: SDUPTHER

## 2021-10-27 ENCOUNTER — TELEPHONE (OUTPATIENT)
Dept: CARDIOLOGY | Facility: CLINIC | Age: 66
End: 2021-10-27

## 2021-10-27 DIAGNOSIS — K21.9 GASTROESOPHAGEAL REFLUX DISEASE WITHOUT ESOPHAGITIS: ICD-10-CM

## 2021-10-27 DIAGNOSIS — I10 ESSENTIAL HYPERTENSION: Primary | ICD-10-CM

## 2021-10-27 RX ORDER — AMLODIPINE BESYLATE 2.5 MG/1
2.5 TABLET ORAL DAILY
Qty: 90 TABLET | Refills: 3 | Status: SHIPPED | OUTPATIENT
Start: 2021-10-27 | End: 2022-08-10

## 2021-10-27 RX ORDER — PANTOPRAZOLE SODIUM 40 MG/1
40 TABLET, DELAYED RELEASE ORAL DAILY
Qty: 90 TABLET | Refills: 3 | Status: SHIPPED | OUTPATIENT
Start: 2021-10-27 | End: 2022-08-10

## 2021-10-27 RX ORDER — ISOSORBIDE MONONITRATE 30 MG/1
30 TABLET, EXTENDED RELEASE ORAL DAILY
Qty: 90 TABLET | Refills: 3 | Status: SHIPPED | OUTPATIENT
Start: 2021-10-27 | End: 2022-08-10

## 2021-10-27 RX ORDER — ISOSORBIDE MONONITRATE 60 MG/1
60 TABLET, EXTENDED RELEASE ORAL DAILY
Qty: 90 TABLET | Refills: 3 | Status: SHIPPED | OUTPATIENT
Start: 2021-10-27 | End: 2022-08-10

## 2021-10-27 NOTE — TELEPHONE ENCOUNTER
CNA PT    PT needs refill on Pantoprazole 40 mg 1 tab daily and Amlodipine 5 mg to [facility].

## 2021-10-28 RX ORDER — CLOPIDOGREL BISULFATE 75 MG/1
75 TABLET ORAL DAILY
Qty: 90 TABLET | Refills: 3 | Status: SHIPPED | OUTPATIENT
Start: 2021-10-28 | End: 2022-08-10

## 2021-12-27 ENCOUNTER — TELEPHONE (OUTPATIENT)
Dept: CARDIOLOGY | Facility: CLINIC | Age: 66
End: 2021-12-27

## 2022-03-08 NOTE — PROGRESS NOTES
"Chief Complaint  No chief complaint on file.    Subjective    History of Present Illness      I saw Rishi Galicia today for cardiovascular care.  He is a pleasant 67-year-old male with known coronary heart disease.  He has undergone previous coronary artery bypass surgery and multiple percutaneous coronary interventions.  He does report some exertional chest discomfort most consistent with class I-II angina.  He does not report rest angina or nocturnal angina.  He has not required sublingual nitroglycerin for relief.  He remains active walking over 3 miles daily.  He is reduce his tobacco use to 1/2 pack of cigarettes daily.  He denies orthopnea or PND no lower extremity edema.  He is free of syncope near syncope or any significant palpitations.  His blood pressure is well controlled.  He remains on PCSK9 inhibitor for lipid control.    Objective   Vital Signs:   /82   Pulse 72   Ht 170.2 cm (67\")   Wt 83.9 kg (185 lb)   BMI 28.98 kg/m²     Constitutional:       Appearance: Well-developed.   Eyes:      Conjunctiva/sclera: Conjunctivae normal.      Pupils: Pupils are equal, round, and reactive to light.   HENT:      Head: Normocephalic and atraumatic.   Neck:      Thyroid: No thyromegaly.   Pulmonary:      Effort: Pulmonary effort is normal. No respiratory distress.      Breath sounds: Normal breath sounds. No wheezing. No rales.   Cardiovascular:      Normal rate. Regular rhythm.      S4 Gallop. No S3 gallop. No rub.   Edema:     Peripheral edema absent.   Abdominal:      General: Bowel sounds are normal. There is no distension.      Palpations: Abdomen is soft. There is no abdominal mass.      Tenderness: There is no abdominal tenderness.   Musculoskeletal: Normal range of motion.      Cervical back: Neck supple. Skin:     General: Skin is warm and dry.      Findings: No erythema.   Neurological:      Mental Status: Alert and oriented to person, place, and time.         Result Review :     Common " labs    Common Labsle 4/29/21 4/29/21    0907 0907   Glucose  108 (A)   BUN  14   Creatinine  1.36 (A)   eGFR Non African Am  52 (A)   Sodium  142   Potassium  4.0   Chloride  107   Calcium  9.0   Albumin  4.30   Total Bilirubin  0.2   Alkaline Phosphatase  60   AST (SGOT)  16   ALT (SGPT)  22   WBC 5.77    Hemoglobin 14.5    Hematocrit 43.3    Platelets 149    (A) Abnormal value                      Assessment and Plan    1. Coronary artery disease due to lipid rich plaque  Class II angina    2. History of coronary artery bypass graft  Class II angina    3. Essential hypertension  Controlled    4. Grade II diastolic dysfunction  Compensated    5. Hyperlipidemia LDL goal <70  Stable    6. Tobacco abuse  Counseled to discontinue completely    Rishi reports stable class II angina.  We discussed further evaluation at the present time we will continue medical treatment.  I reviewed his last coronary angiography with him from May 4, 2021.  He does report some head fullness after he takes his medications in the morning.  I have asked him to start taking his isosorbide mononitrate 90 mg at bedtime.  I will plan to see him in follow-up in 3 months sooner if needed.  He will monitor his symptoms and contact me if there is any progression of his angina.    Follow Up   No follow-ups on file.  Patient was given instructions and counseling regarding his condition or for health maintenance advice. Please see specific information pulled into the AVS if appropriate.

## 2022-03-09 ENCOUNTER — OFFICE VISIT (OUTPATIENT)
Dept: CARDIOLOGY | Facility: CLINIC | Age: 67
End: 2022-03-09

## 2022-03-09 VITALS
BODY MASS INDEX: 29.03 KG/M2 | DIASTOLIC BLOOD PRESSURE: 82 MMHG | WEIGHT: 185 LBS | HEIGHT: 67 IN | SYSTOLIC BLOOD PRESSURE: 118 MMHG | HEART RATE: 72 BPM

## 2022-03-09 DIAGNOSIS — I51.89 GRADE II DIASTOLIC DYSFUNCTION: ICD-10-CM

## 2022-03-09 DIAGNOSIS — E78.5 HYPERLIPIDEMIA LDL GOAL <70: ICD-10-CM

## 2022-03-09 DIAGNOSIS — I25.83 CORONARY ARTERY DISEASE DUE TO LIPID RICH PLAQUE: Primary | ICD-10-CM

## 2022-03-09 DIAGNOSIS — R40.0 DAYTIME SOMNOLENCE: ICD-10-CM

## 2022-03-09 DIAGNOSIS — Z72.0 TOBACCO ABUSE: ICD-10-CM

## 2022-03-09 DIAGNOSIS — R53.82 CHRONIC FATIGUE: ICD-10-CM

## 2022-03-09 DIAGNOSIS — I10 ESSENTIAL HYPERTENSION: ICD-10-CM

## 2022-03-09 DIAGNOSIS — Z95.1 HISTORY OF CORONARY ARTERY BYPASS GRAFT: ICD-10-CM

## 2022-03-09 DIAGNOSIS — I25.10 CORONARY ARTERY DISEASE DUE TO LIPID RICH PLAQUE: Primary | ICD-10-CM

## 2022-03-09 PROCEDURE — 99214 OFFICE O/P EST MOD 30 MIN: CPT | Performed by: INTERNAL MEDICINE

## 2022-03-24 ENCOUNTER — TELEPHONE (OUTPATIENT)
Dept: CARDIOLOGY | Facility: CLINIC | Age: 67
End: 2022-03-24

## 2022-03-24 DIAGNOSIS — I25.83 CORONARY ARTERY DISEASE DUE TO LIPID RICH PLAQUE: Primary | ICD-10-CM

## 2022-03-24 DIAGNOSIS — Z91.041 PERSONAL HISTORY OF ALLERGY TO RADIOGRAPHIC DYE: ICD-10-CM

## 2022-03-24 DIAGNOSIS — Z01.818 PRE-OP TESTING: ICD-10-CM

## 2022-03-24 DIAGNOSIS — R07.89 CHEST PAIN, ATYPICAL: ICD-10-CM

## 2022-03-24 DIAGNOSIS — I25.10 CORONARY ARTERY DISEASE DUE TO LIPID RICH PLAQUE: Primary | ICD-10-CM

## 2022-03-24 NOTE — TELEPHONE ENCOUNTER
CNA pt    Patient has been taking Isosorbide since last visit with Dr. Crook, states it has not been working. Would like to know if he can proceed with heart cath?    CB# [phone]

## 2022-03-25 RX ORDER — PREDNISONE 50 MG/1
TABLET ORAL
Qty: 3 TABLET | Refills: 0 | Status: SHIPPED | OUTPATIENT
Start: 2022-03-25 | End: 2022-04-01 | Stop reason: HOSPADM

## 2022-03-25 RX ORDER — CIMETIDINE 400 MG/1
TABLET, FILM COATED ORAL
Qty: 1 TABLET | Refills: 0 | Status: SHIPPED | OUTPATIENT
Start: 2022-03-25 | End: 2022-08-02

## 2022-03-29 ENCOUNTER — LAB (OUTPATIENT)
Dept: LAB | Facility: HOSPITAL | Age: 67
End: 2022-03-29

## 2022-03-29 DIAGNOSIS — I25.83 CORONARY ARTERY DISEASE DUE TO LIPID RICH PLAQUE: ICD-10-CM

## 2022-03-29 DIAGNOSIS — Z01.818 PRE-OP TESTING: ICD-10-CM

## 2022-03-29 DIAGNOSIS — Z01.818 OTHER SPECIFIED PRE-OPERATIVE EXAMINATION: ICD-10-CM

## 2022-03-29 DIAGNOSIS — R07.89 CHEST PAIN, ATYPICAL: ICD-10-CM

## 2022-03-29 DIAGNOSIS — I25.10 CORONARY ARTERY DISEASE DUE TO LIPID RICH PLAQUE: ICD-10-CM

## 2022-03-29 LAB
ALBUMIN SERPL-MCNC: 4.6 G/DL (ref 3.5–5.2)
ALBUMIN/GLOB SERPL: 2 G/DL
ALP SERPL-CCNC: 82 U/L (ref 39–117)
ALT SERPL W P-5'-P-CCNC: 17 U/L (ref 1–41)
ANION GAP SERPL CALCULATED.3IONS-SCNC: 13 MMOL/L (ref 5–15)
AST SERPL-CCNC: 9 U/L (ref 1–40)
BASOPHILS # BLD AUTO: 0.05 10*3/MM3 (ref 0–0.2)
BASOPHILS NFR BLD AUTO: 0.8 % (ref 0–1.5)
BILIRUB SERPL-MCNC: 0.3 MG/DL (ref 0–1.2)
BUN SERPL-MCNC: 18 MG/DL (ref 8–23)
BUN/CREAT SERPL: 11.8 (ref 7–25)
CALCIUM SPEC-SCNC: 9 MG/DL (ref 8.6–10.5)
CHLORIDE SERPL-SCNC: 104 MMOL/L (ref 98–107)
CO2 SERPL-SCNC: 23 MMOL/L (ref 22–29)
CREAT SERPL-MCNC: 1.52 MG/DL (ref 0.76–1.27)
DEPRECATED RDW RBC AUTO: 42.3 FL (ref 37–54)
EGFRCR SERPLBLD CKD-EPI 2021: 49.6 ML/MIN/1.73
EOSINOPHIL # BLD AUTO: 0.27 10*3/MM3 (ref 0–0.4)
EOSINOPHIL NFR BLD AUTO: 4.3 % (ref 0.3–6.2)
ERYTHROCYTE [DISTWIDTH] IN BLOOD BY AUTOMATED COUNT: 12.8 % (ref 12.3–15.4)
GLOBULIN UR ELPH-MCNC: 2.3 GM/DL
GLUCOSE SERPL-MCNC: 96 MG/DL (ref 65–99)
HCT VFR BLD AUTO: 43.3 % (ref 37.5–51)
HGB BLD-MCNC: 14.8 G/DL (ref 13–17.7)
IMM GRANULOCYTES # BLD AUTO: 0.02 10*3/MM3 (ref 0–0.05)
IMM GRANULOCYTES NFR BLD AUTO: 0.3 % (ref 0–0.5)
INR PPP: 1.14 (ref 0.9–1.1)
LYMPHOCYTES # BLD AUTO: 2.07 10*3/MM3 (ref 0.7–3.1)
LYMPHOCYTES NFR BLD AUTO: 32.6 % (ref 19.6–45.3)
MCH RBC QN AUTO: 30.6 PG (ref 26.6–33)
MCHC RBC AUTO-ENTMCNC: 34.2 G/DL (ref 31.5–35.7)
MCV RBC AUTO: 89.6 FL (ref 79–97)
MONOCYTES # BLD AUTO: 0.52 10*3/MM3 (ref 0.1–0.9)
MONOCYTES NFR BLD AUTO: 8.2 % (ref 5–12)
NEUTROPHILS NFR BLD AUTO: 3.42 10*3/MM3 (ref 1.7–7)
NEUTROPHILS NFR BLD AUTO: 53.8 % (ref 42.7–76)
NRBC BLD AUTO-RTO: 0 /100 WBC (ref 0–0.2)
PLATELET # BLD AUTO: 143 10*3/MM3 (ref 140–450)
PMV BLD AUTO: 11.1 FL (ref 6–12)
POTASSIUM SERPL-SCNC: 4.3 MMOL/L (ref 3.5–5.2)
PROT SERPL-MCNC: 6.9 G/DL (ref 6–8.5)
PROTHROMBIN TIME: 14.6 SECONDS (ref 11.7–14.2)
RBC # BLD AUTO: 4.83 10*6/MM3 (ref 4.14–5.8)
SARS-COV-2 ORF1AB RESP QL NAA+PROBE: NOT DETECTED
SODIUM SERPL-SCNC: 140 MMOL/L (ref 136–145)
WBC NRBC COR # BLD: 6.35 10*3/MM3 (ref 3.4–10.8)

## 2022-03-29 PROCEDURE — 85025 COMPLETE CBC W/AUTO DIFF WBC: CPT

## 2022-03-29 PROCEDURE — 85610 PROTHROMBIN TIME: CPT

## 2022-03-29 PROCEDURE — C9803 HOPD COVID-19 SPEC COLLECT: HCPCS

## 2022-03-29 PROCEDURE — 36415 COLL VENOUS BLD VENIPUNCTURE: CPT

## 2022-03-29 PROCEDURE — 80053 COMPREHEN METABOLIC PANEL: CPT

## 2022-03-29 PROCEDURE — U0004 COV-19 TEST NON-CDC HGH THRU: HCPCS

## 2022-03-30 RX ORDER — EZETIMIBE 10 MG/1
10 TABLET ORAL DAILY
COMMUNITY

## 2022-03-31 ENCOUNTER — HOSPITAL ENCOUNTER (OUTPATIENT)
Facility: HOSPITAL | Age: 67
Setting detail: OBSERVATION
Discharge: HOME | End: 2022-04-01
Attending: INTERNAL MEDICINE | Admitting: INTERNAL MEDICINE

## 2022-03-31 DIAGNOSIS — I25.10 CORONARY ARTERY DISEASE DUE TO LIPID RICH PLAQUE: Primary | ICD-10-CM

## 2022-03-31 DIAGNOSIS — I25.83 CORONARY ARTERY DISEASE DUE TO LIPID RICH PLAQUE: Primary | ICD-10-CM

## 2022-03-31 DIAGNOSIS — R07.89 CHEST PAIN, ATYPICAL: ICD-10-CM

## 2022-03-31 PROCEDURE — 99153 MOD SED SAME PHYS/QHP EA: CPT | Performed by: INTERNAL MEDICINE

## 2022-03-31 PROCEDURE — 25010000002 FENTANYL CITRATE (PF) 50 MCG/ML SOLUTION: Performed by: INTERNAL MEDICINE

## 2022-03-31 PROCEDURE — C1894 INTRO/SHEATH, NON-LASER: HCPCS | Performed by: INTERNAL MEDICINE

## 2022-03-31 PROCEDURE — C1769 GUIDE WIRE: HCPCS | Performed by: INTERNAL MEDICINE

## 2022-03-31 PROCEDURE — 25010000002 HEPARIN (PORCINE) PER 1000 UNITS: Performed by: INTERNAL MEDICINE

## 2022-03-31 PROCEDURE — C1887 CATHETER, GUIDING: HCPCS | Performed by: INTERNAL MEDICINE

## 2022-03-31 PROCEDURE — C1874 STENT, COATED/COV W/DEL SYS: HCPCS | Performed by: INTERNAL MEDICINE

## 2022-03-31 PROCEDURE — C1725 CATH, TRANSLUMIN NON-LASER: HCPCS | Performed by: INTERNAL MEDICINE

## 2022-03-31 PROCEDURE — 93459 L HRT ART/GRFT ANGIO: CPT | Performed by: INTERNAL MEDICINE

## 2022-03-31 PROCEDURE — 25010000002 MIDAZOLAM PER 1 MG: Performed by: INTERNAL MEDICINE

## 2022-03-31 PROCEDURE — G0378 HOSPITAL OBSERVATION PER HR: HCPCS

## 2022-03-31 PROCEDURE — C9604 PERC D-E COR REVASC T CABG S: HCPCS | Performed by: INTERNAL MEDICINE

## 2022-03-31 PROCEDURE — C1760 CLOSURE DEV, VASC: HCPCS | Performed by: INTERNAL MEDICINE

## 2022-03-31 PROCEDURE — 25010000002 BH (CUPID ONLY) ADENOSINE 6 MG/100ML MIXTURE: Performed by: INTERNAL MEDICINE

## 2022-03-31 PROCEDURE — 0 IOPAMIDOL PER 1 ML: Performed by: INTERNAL MEDICINE

## 2022-03-31 PROCEDURE — 85347 COAGULATION TIME ACTIVATED: CPT

## 2022-03-31 PROCEDURE — 92937 PRQ TRLUML REVSC CAB GRF 1: CPT | Performed by: INTERNAL MEDICINE

## 2022-03-31 PROCEDURE — 99152 MOD SED SAME PHYS/QHP 5/>YRS: CPT | Performed by: INTERNAL MEDICINE

## 2022-03-31 DEVICE — XIENCE SKYPOINT™ EVEROLIMUS ELUTING CORONARY STENT SYSTEM 4.00 MM X 23 MM / RAPID-EXCHANGE
Type: IMPLANTABLE DEVICE | Status: FUNCTIONAL
Brand: XIENCE SKYPOINT™

## 2022-03-31 RX ORDER — CLOPIDOGREL BISULFATE 75 MG/1
TABLET ORAL AS NEEDED
Status: DISCONTINUED | OUTPATIENT
Start: 2022-03-31 | End: 2022-03-31 | Stop reason: HOSPADM

## 2022-03-31 RX ORDER — ISOSORBIDE MONONITRATE 30 MG/1
30 TABLET, EXTENDED RELEASE ORAL DAILY
Status: DISCONTINUED | OUTPATIENT
Start: 2022-03-31 | End: 2022-04-01 | Stop reason: HOSPADM

## 2022-03-31 RX ORDER — ACETAMINOPHEN 325 MG/1
650 TABLET ORAL EVERY 4 HOURS PRN
Status: DISCONTINUED | OUTPATIENT
Start: 2022-03-31 | End: 2022-04-01 | Stop reason: HOSPADM

## 2022-03-31 RX ORDER — SODIUM CHLORIDE 9 MG/ML
75 INJECTION, SOLUTION INTRAVENOUS CONTINUOUS
Status: DISCONTINUED | OUTPATIENT
Start: 2022-03-31 | End: 2022-04-01 | Stop reason: HOSPADM

## 2022-03-31 RX ORDER — AMLODIPINE BESYLATE 2.5 MG/1
2.5 TABLET ORAL DAILY
Status: DISCONTINUED | OUTPATIENT
Start: 2022-04-01 | End: 2022-04-01 | Stop reason: HOSPADM

## 2022-03-31 RX ORDER — NITROGLYCERIN 0.4 MG/1
0.4 TABLET SUBLINGUAL
Status: DISCONTINUED | OUTPATIENT
Start: 2022-03-31 | End: 2022-04-01 | Stop reason: HOSPADM

## 2022-03-31 RX ORDER — ACETAMINOPHEN 325 MG/1
650 TABLET ORAL EVERY 4 HOURS PRN
Status: DISCONTINUED | OUTPATIENT
Start: 2022-03-31 | End: 2022-03-31 | Stop reason: SDUPTHER

## 2022-03-31 RX ORDER — LIDOCAINE HYDROCHLORIDE 20 MG/ML
INJECTION, SOLUTION INFILTRATION; PERINEURAL AS NEEDED
Status: DISCONTINUED | OUTPATIENT
Start: 2022-03-31 | End: 2022-03-31 | Stop reason: HOSPADM

## 2022-03-31 RX ORDER — CHOLECALCIFEROL (VITAMIN D3) 125 MCG
1000 CAPSULE ORAL DAILY
Status: DISCONTINUED | OUTPATIENT
Start: 2022-03-31 | End: 2022-04-01 | Stop reason: HOSPADM

## 2022-03-31 RX ORDER — FENTANYL CITRATE 50 UG/ML
INJECTION, SOLUTION INTRAMUSCULAR; INTRAVENOUS AS NEEDED
Status: DISCONTINUED | OUTPATIENT
Start: 2022-03-31 | End: 2022-03-31 | Stop reason: HOSPADM

## 2022-03-31 RX ORDER — ISOSORBIDE MONONITRATE 60 MG/1
60 TABLET, EXTENDED RELEASE ORAL DAILY
Status: DISCONTINUED | OUTPATIENT
Start: 2022-03-31 | End: 2022-04-01 | Stop reason: HOSPADM

## 2022-03-31 RX ORDER — SODIUM CHLORIDE 0.9 % (FLUSH) 0.9 %
10 SYRINGE (ML) INJECTION AS NEEDED
Status: DISCONTINUED | OUTPATIENT
Start: 2022-03-31 | End: 2022-03-31 | Stop reason: HOSPADM

## 2022-03-31 RX ORDER — LOSARTAN POTASSIUM 25 MG/1
25 TABLET ORAL DAILY
Status: DISCONTINUED | OUTPATIENT
Start: 2022-03-31 | End: 2022-04-01 | Stop reason: HOSPADM

## 2022-03-31 RX ORDER — HEPARIN SODIUM 1000 [USP'U]/ML
INJECTION, SOLUTION INTRAVENOUS; SUBCUTANEOUS AS NEEDED
Status: DISCONTINUED | OUTPATIENT
Start: 2022-03-31 | End: 2022-03-31 | Stop reason: HOSPADM

## 2022-03-31 RX ORDER — SODIUM CHLORIDE 0.9 % (FLUSH) 0.9 %
10 SYRINGE (ML) INJECTION EVERY 12 HOURS SCHEDULED
Status: DISCONTINUED | OUTPATIENT
Start: 2022-03-31 | End: 2022-03-31 | Stop reason: HOSPADM

## 2022-03-31 RX ORDER — SUCRALFATE 1 G/1
1 TABLET ORAL 4 TIMES DAILY
Status: DISCONTINUED | OUTPATIENT
Start: 2022-03-31 | End: 2022-04-01 | Stop reason: HOSPADM

## 2022-03-31 RX ORDER — LEVOTHYROXINE SODIUM 137 UG/1
137 TABLET ORAL DAILY
Status: DISCONTINUED | OUTPATIENT
Start: 2022-03-31 | End: 2022-04-01 | Stop reason: HOSPADM

## 2022-03-31 RX ORDER — MIDAZOLAM HYDROCHLORIDE 1 MG/ML
INJECTION INTRAMUSCULAR; INTRAVENOUS AS NEEDED
Status: DISCONTINUED | OUTPATIENT
Start: 2022-03-31 | End: 2022-03-31 | Stop reason: HOSPADM

## 2022-03-31 RX ORDER — PANTOPRAZOLE SODIUM 40 MG/1
40 TABLET, DELAYED RELEASE ORAL DAILY
Status: DISCONTINUED | OUTPATIENT
Start: 2022-03-31 | End: 2022-04-01 | Stop reason: HOSPADM

## 2022-03-31 RX ORDER — SODIUM CHLORIDE 9 MG/ML
100 INJECTION, SOLUTION INTRAVENOUS CONTINUOUS
Status: DISCONTINUED | OUTPATIENT
Start: 2022-03-31 | End: 2022-04-01 | Stop reason: HOSPADM

## 2022-03-31 RX ORDER — ASPIRIN 81 MG/1
81 TABLET ORAL DAILY
Status: DISCONTINUED | OUTPATIENT
Start: 2022-03-31 | End: 2022-04-01 | Stop reason: HOSPADM

## 2022-03-31 RX ORDER — ASPIRIN 81 MG/1
TABLET, CHEWABLE ORAL AS NEEDED
Status: DISCONTINUED | OUTPATIENT
Start: 2022-03-31 | End: 2022-03-31 | Stop reason: HOSPADM

## 2022-03-31 RX ORDER — CLOPIDOGREL BISULFATE 75 MG/1
75 TABLET ORAL DAILY
Status: DISCONTINUED | OUTPATIENT
Start: 2022-04-01 | End: 2022-04-01 | Stop reason: HOSPADM

## 2022-03-31 RX ADMIN — ACETAMINOPHEN 650 MG: 325 TABLET ORAL at 20:36

## 2022-03-31 RX ADMIN — SODIUM CHLORIDE 75 ML/HR: 9 INJECTION, SOLUTION INTRAVENOUS at 09:41

## 2022-04-01 VITALS
BODY MASS INDEX: 27.89 KG/M2 | HEIGHT: 68 IN | DIASTOLIC BLOOD PRESSURE: 77 MMHG | WEIGHT: 184 LBS | RESPIRATION RATE: 16 BRPM | OXYGEN SATURATION: 96 % | HEART RATE: 76 BPM | SYSTOLIC BLOOD PRESSURE: 127 MMHG | TEMPERATURE: 98.1 F

## 2022-04-01 LAB
ACT BLD: 237 SECONDS (ref 82–152)
ANION GAP SERPL CALCULATED.3IONS-SCNC: 10.5 MMOL/L (ref 5–15)
BUN SERPL-MCNC: 30 MG/DL (ref 8–23)
BUN/CREAT SERPL: 19 (ref 7–25)
CALCIUM SPEC-SCNC: 8.9 MG/DL (ref 8.6–10.5)
CHLORIDE SERPL-SCNC: 106 MMOL/L (ref 98–107)
CO2 SERPL-SCNC: 20.5 MMOL/L (ref 22–29)
CREAT SERPL-MCNC: 1.58 MG/DL (ref 0.76–1.27)
EGFRCR SERPLBLD CKD-EPI 2021: 47.4 ML/MIN/1.73
GLUCOSE SERPL-MCNC: 115 MG/DL (ref 65–99)
POTASSIUM SERPL-SCNC: 3.9 MMOL/L (ref 3.5–5.2)
SODIUM SERPL-SCNC: 137 MMOL/L (ref 136–145)

## 2022-04-01 PROCEDURE — 99217 PR OBSERVATION CARE DISCHARGE MANAGEMENT: CPT | Performed by: INTERNAL MEDICINE

## 2022-04-01 PROCEDURE — 80048 BASIC METABOLIC PNL TOTAL CA: CPT | Performed by: INTERNAL MEDICINE

## 2022-04-01 PROCEDURE — G0378 HOSPITAL OBSERVATION PER HR: HCPCS

## 2022-04-01 RX ADMIN — PANTOPRAZOLE SODIUM 40 MG: 40 TABLET, DELAYED RELEASE ORAL at 08:15

## 2022-04-01 RX ADMIN — LOSARTAN POTASSIUM 25 MG: 25 TABLET, FILM COATED ORAL at 08:15

## 2022-04-01 RX ADMIN — SUCRALFATE 1 G: 1 TABLET ORAL at 08:15

## 2022-04-01 RX ADMIN — CLOPIDOGREL 75 MG: 75 TABLET, FILM COATED ORAL at 08:15

## 2022-04-01 RX ADMIN — ASPIRIN 81 MG: 81 TABLET, COATED ORAL at 08:15

## 2022-04-01 RX ADMIN — Medication 1000 MCG: at 08:15

## 2022-04-01 RX ADMIN — AMLODIPINE BESYLATE 2.5 MG: 2.5 TABLET ORAL at 08:15

## 2022-04-01 RX ADMIN — SUCRALFATE 1 G: 1 TABLET ORAL at 11:41

## 2022-04-01 RX ADMIN — LEVOTHYROXINE SODIUM 137 MCG: 0.14 TABLET ORAL at 08:15

## 2022-04-01 NOTE — CASE MANAGEMENT/SOCIAL WORK
Case Management Discharge Note      Final Note: home         Selected Continued Care - Discharged on 4/1/2022 Admission date: 3/31/2022 - Discharge disposition: Home or Self Care    Destination    No services have been selected for the patient.              Durable Medical Equipment    No services have been selected for the patient.              Dialysis/Infusion    No services have been selected for the patient.              Home Medical Care    No services have been selected for the patient.              Therapy    No services have been selected for the patient.              Community Resources    No services have been selected for the patient.              Community & DME    No services have been selected for the patient.                  Transportation Services  Private: Car    Final Discharge Disposition Code: 01 - home or self-care

## 2022-04-01 NOTE — DISCHARGE SUMMARY
[facility]    DISCHARGE SUMMARY      Patient Name: Rishi Galicia  :1955  67 y.o.    Date of Admit: 3/31/2022  Date of Discharge:  2022    Discharge Diagnosis:  Problems Addressed this Visit        Cardiology Problems    Coronary artery disease due to lipid rich plaque - Primary (Chronic)    Relevant Orders    Cardiac Catheterization/Vascular Study (Completed)    Ambulatory Referral to Cardiac Rehab      Other Visit Diagnoses     Chest pain, atypical        Relevant Orders    Cardiac Catheterization/Vascular Study (Completed)      Diagnoses       Codes Comments    Coronary artery disease due to lipid rich plaque    -  Primary ICD-10-CM: I25.10, I25.83  ICD-9-CM: 414.00, 414.3     Chest pain, atypical     ICD-10-CM: R07.89  ICD-9-CM: 786.59           Hospital Course:   Mr. Galicia is a 59-year-old M with known coronary disease.  He still agrees coronary multiple percutaneous interventions.  He presented to the office with exertional chest discomfort.  He was scheduled for cardiac catheterization.  His cardiac catheterization was performed on 3/31/2022.  Results are as noted below.  He underwent drug-eluting stent placement to the status regarding obtuse marginal branch.  He tolerated procedure without difficulty.  Today, he feels well.  He has been ambulating without difficulty.  He does have a history of CKD.  Today discharge his creatinine was 1.58.  BUN 30.  He will be discharged to follow-up with Dr. Yunior Crook on May 4, 2020.    Procedures Performed  Procedure(s):  Left Heart Cath  Left ventriculography  Coronary angiography  Saphenous Vein Graft  Stent JAMES bypass graft    Left heart catheterization:     1. Opening aortic pressure is 129/61 mmHg the mean pressure of 87 mmHg  2.  The left ventricular end-diastolic pressure at end expiration was 21 mmHg  3.  There is no gradient across aortic valve on pullback     Left ventriculography    Overall estimated left ventricular  "ejection fraction was 60% with mild lateral wall hypokinesis     Native coronary arteriography: \"Dominant circulation     1.  Left main coronary artery is a large-caliber vessel gives rise left anterior descending left circumflex flex arteries.  There is a patent stent in the ostial proximal to distal left main with an eccentric ostial proximal 30% focal stenosis.   2.  Left anterior descending artery is a large-caliber vessel that gives rise to large caliber diagonal branches as it courses along the anterior interventricular groove and wraps around the apex.  There is flush occlusion of the LAD at its ostium with streaming into the ostial proximal LAD.   3.  The left circumflex artery is a large-caliber codominant vessel gives rise to large caliber bifurcating obtuse marginal branches.  There is patent stents in the ostial proximal to mid distal circumflex artery with proximal 30% in-stent restenosis and a 40% focal concentric lesion after the stented segment with a patent stent in the distal left PDA and subtotal occlusion of less than 1 mm branches thereafter.    4.  The right coronary artery is a large-caliber vessel gives rise distally to large caliber posterior descending and posterior lateral ventricular branch.  There is chronic total occlusion of the vessel in the proximal portion     The LIMA to the LAD is widely patent in its ostium throughout its course and at its anastomosis.  After the anastomosis there is a focal 40% lesion in the diffuse stented segment from the mid to distal into the apical LAD with diffuse 30% in-stent restenosis and perhaps 30 to 40% in-stent restenosis distally     The aorto SVG graft to the obtuse marginal branch is widely patent at its ostium however in the midportion of the vessel there is an 80% concentric stenosis after which there is widely patent graft to the anastomosis.  After the anastomosis there is a stented segment that has 20 to 30% in-stent restenosis diffusely.  " This vessel supplies left-to-right collaterals to a diffusely diseased distal right coronary artery.     Conclusions:     1.  Severely elevated left heart filling pressures with preserved LV systolic function  2.  Normal epicardial anatomy with severe three-vessel coronary artery disease  3.  Severe disease of the culprit SVG graft to obtuse marginal branch.  4.  Widely patent LIMA to the LAD with nonobstructive disease within the system  5.  Chronic total occlusion of the right coronary artery which is nonrevascularizable due to absence of distal targets.  6.  Successful PCI and stenting of the SVG graft as described above.     Recommendations:      Consults     No orders found for last 30 day(s).          Pertinent Test Results:   Results from last 7 days   Lab Units 04/01/22  1148 03/29/22  1011   SODIUM mmol/L 137 140   POTASSIUM mmol/L 3.9 4.3   CHLORIDE mmol/L 106 104   CO2 mmol/L 20.5* 23.0   BUN mg/dL 30* 18   CREATININE mg/dL 1.58* 1.52*   CALCIUM mg/dL 8.9 9.0   BILIRUBIN mg/dL  --  0.3   ALK PHOS U/L  --  82   ALT (SGPT) U/L  --  17   AST (SGOT) U/L  --  9   GLUCOSE mg/dL 115* 96         @LABRCNT(bnp)@  Results from last 7 days   Lab Units 03/29/22  1011   WBC 10*3/mm3 6.35   HEMOGLOBIN g/dL 14.8   HEMATOCRIT % 43.3   PLATELETS 10*3/mm3 143     Results from last 7 days   Lab Units 03/29/22  1011   INR  1.14*               Condition on Discharge: stable    Discharge Medications     Discharge Medications      Changes to Medications      Instructions Start Date   acetaminophen 325 MG tablet  Commonly known as: TYLENOL  What changed: when to take this   650 mg, Oral, Every 4 Hours PRN      sucralfate 1 g tablet  Commonly known as: Carafate  What changed: additional instructions   1 g, Oral, 4 Times Daily         Continue These Medications      Instructions Start Date   alfuzosin 10 MG 24 hr tablet  Commonly known as: UROXATRAL   10 mg, Oral, Nightly      amLODIPine 2.5 MG tablet  Commonly known as: NORVASC    2.5 mg, Oral, Daily      cimetidine 400 MG tablet  Commonly known as: TAGAMET   Take 1 tablet the night before your procedure      clopidogrel 75 MG tablet  Commonly known as: PLAVIX   75 mg, Oral, Daily      diphenhydrAMINE 50 MG tablet  Commonly known as: BENADRYL   Take 1 tablet the night before your procedure and take 1 tablet the morning of your procedure      ezetimibe 10 MG tablet  Commonly known as: ZETIA   10 mg, Oral, Daily      isosorbide mononitrate 60 MG 24 hr tablet  Commonly known as: IMDUR   60 mg, Oral, Daily, Take one 60mg tab and one 30mg tab together daily      isosorbide mononitrate 30 MG 24 hr tablet  Commonly known as: IMDUR   30 mg, Oral, Daily, Take one 60mg tab and one 30mg tab together daily      levothyroxine 137 MCG tablet  Commonly known as: SYNTHROID, LEVOTHROID   1 tablet, Oral, Daily      losartan 50 MG tablet  Commonly known as: COZAAR   25 mg, Oral, Daily      nitroglycerin 0.4 MG SL tablet  Commonly known as: NITROSTAT   1 under the tongue as needed for angina, may repeat q5mins for up three doses      pantoprazole 40 MG EC tablet  Commonly known as: PROTONIX   40 mg, Oral, Daily      vitamin B-12 1000 MCG tablet  Commonly known as: CYANOCOBALAMIN   1,000 mcg, Oral, Daily         Stop These Medications    predniSONE 50 MG tablet  Commonly known as: DELTASONE        ASK your doctor about these medications      Instructions Start Date   aspirin 81 MG chewable tablet   81 mg, Oral, Daily      Evolocumab solution auto-injector SureClick injection  Commonly known as: REPATHA   1 syringe, Subcutaneous, Every 14 Days             Discharge Diet:   Healthy heart  Activity at Discharge: As tolerated    Discharge disposition: home    Follow-up Appointments  Future Appointments   Date Time Provider Department Center   5/4/2022  1:45 PM Yunior Crook MD [facility]   6/8/2022  1:45 PM Yunior Crook MD [facility]     Additional Instructions for the Follow-ups that You Need to  Schedule     Ambulatory Referral to Cardiac Rehab   As directed            Test Results Pending at Discharge       Hien Crook MD, [facility]  04/01/22  12:54 EDT    Time: Discharge 35 min

## 2022-04-01 NOTE — CONSULTS
Met with patient, discussed benefits of cardiac rehab. Patient reports that he attended cardaic rehab previously at [facility] and that he does not feel the need to repeat this program at this time.Provided phase II information along with the contact information for cardiac rehab there, explained if he were to change his mind he could let his doctor know.

## 2022-05-03 NOTE — PROGRESS NOTES
"Chief Complaint  Follow-up    Subjective    History of Present Illness      I saw Rishi Galicia today for cardiovascular care.  He is a pleasant 67-year-old male with coronary heart disease.  He is status post coronary artery bypass surgery and multiple percutaneous coronary interventions.  Most recent was on 3/31/2022 with a Xience drug-eluting stent 4.0 x 23 to the saphenous vein graft to the first marginal branch of the circumflex reducing an 80% stenosis to 0% residual narrowing.  Rishi reports that his chest discomfort has completely resolved.  His respiratory status is stable.  He does report some generalized fatigue and occasional hot flashes.  He does not report orthopnea or PND or lower extremity edema.  He is free of syncope near syncope or significant palpitations.  He continues to smoke up to 1/2 pack/day.  He overall tolerates his medical regimen well.  His blood pressure is controlled.  He remains on PCSK9 inhibitor and Zetia for cholesterol control.  He is on dual antiplatelet therapy which he tolerates well.    Objective   Vital Signs:   /68   Pulse 72   Ht 172.7 cm (68\")   Wt 84.8 kg (187 lb)   BMI 28.43 kg/m²     Constitutional:       Appearance: Well-developed.   Eyes:      Conjunctiva/sclera: Conjunctivae normal.      Pupils: Pupils are equal, round, and reactive to light.   HENT:      Head: Normocephalic and atraumatic.   Neck:      Thyroid: No thyromegaly.   Pulmonary:      Effort: Pulmonary effort is normal. No respiratory distress.      Breath sounds: Normal breath sounds. No wheezing. No rales.   Cardiovascular:      Normal rate. Regular rhythm.      S4 Gallop. No S3 gallop. No rub.   Edema:     Peripheral edema absent.   Abdominal:      General: Bowel sounds are normal. There is no distension.      Palpations: Abdomen is soft. There is no abdominal mass.      Tenderness: There is no abdominal tenderness.   Musculoskeletal: Normal range of motion.      Cervical back: Neck " supple. Skin:     General: Skin is warm and dry.      Findings: No erythema.   Neurological:      Mental Status: Alert and oriented to person, place, and time.         Result Review :     Common labs    Common Labsle 3/29/22 3/29/22 4/1/22    1011 1011    Glucose  96 115 (A)   BUN  18 30 (A)   Creatinine  1.52 (A) 1.58 (A)   Sodium  140 137   Potassium  4.3 3.9   Chloride  104 106   Calcium  9.0 8.9   Albumin  4.60    Total Bilirubin  0.3    Alkaline Phosphatase  82    AST (SGOT)  9    ALT (SGPT)  17    WBC 6.35     Hemoglobin 14.8     Hematocrit 43.3     Platelets 143     (A) Abnormal value                      Assessment and Plan    1. Coronary artery disease due to lipid rich plaque  Stable free of angina    2. S/P coronary artery stent placement  Multiple prior stent deployment, stable    3. History of coronary artery bypass graft  Stable    4. Essential hypertension  Controlled    5. Hyperlipidemia LDL goal <70  Continue PCSK9 inhibitor and Zetia    6. Grade II diastolic dysfunction  Compensated    7. Tobacco abuse  Counseled to discontinue    Rishi is free of angina euvolemic on examination.  I have counseled him on the importance of discontinue tobacco use.  His blood pressure is controlled and he tolerates his medications well.  I will see him in follow-up in 3 to 4 months sooner if needed.        Follow Up   No follow-ups on file.  Patient was given instructions and counseling regarding his condition or for health maintenance advice. Please see specific information pulled into the AVS if appropriate.

## 2022-05-04 ENCOUNTER — OFFICE VISIT (OUTPATIENT)
Dept: CARDIOLOGY | Facility: CLINIC | Age: 67
End: 2022-05-04

## 2022-05-04 VITALS
HEIGHT: 68 IN | BODY MASS INDEX: 28.34 KG/M2 | DIASTOLIC BLOOD PRESSURE: 68 MMHG | HEART RATE: 72 BPM | SYSTOLIC BLOOD PRESSURE: 130 MMHG | WEIGHT: 187 LBS

## 2022-05-04 DIAGNOSIS — Z95.5 S/P CORONARY ARTERY STENT PLACEMENT: ICD-10-CM

## 2022-05-04 DIAGNOSIS — I25.83 CORONARY ARTERY DISEASE DUE TO LIPID RICH PLAQUE: Primary | ICD-10-CM

## 2022-05-04 DIAGNOSIS — Z95.1 HISTORY OF CORONARY ARTERY BYPASS GRAFT: ICD-10-CM

## 2022-05-04 DIAGNOSIS — I51.89 GRADE II DIASTOLIC DYSFUNCTION: ICD-10-CM

## 2022-05-04 DIAGNOSIS — Z72.0 TOBACCO ABUSE: ICD-10-CM

## 2022-05-04 DIAGNOSIS — E78.5 HYPERLIPIDEMIA LDL GOAL <70: ICD-10-CM

## 2022-05-04 DIAGNOSIS — I10 ESSENTIAL HYPERTENSION: ICD-10-CM

## 2022-05-04 DIAGNOSIS — I25.10 CORONARY ARTERY DISEASE DUE TO LIPID RICH PLAQUE: Primary | ICD-10-CM

## 2022-05-04 PROCEDURE — 99214 OFFICE O/P EST MOD 30 MIN: CPT | Performed by: INTERNAL MEDICINE

## 2022-05-04 RX ORDER — ASPIRIN 81 MG/1
81 TABLET ORAL DAILY
COMMUNITY
End: 2022-08-02

## 2022-08-02 ENCOUNTER — APPOINTMENT (OUTPATIENT)
Dept: GENERAL RADIOLOGY | Facility: HOSPITAL | Age: 67
End: 2022-08-02

## 2022-08-02 ENCOUNTER — HOSPITAL ENCOUNTER (OUTPATIENT)
Facility: HOSPITAL | Age: 67
Setting detail: OBSERVATION
Discharge: HOME | End: 2022-08-04
Attending: EMERGENCY MEDICINE | Admitting: INTERNAL MEDICINE

## 2022-08-02 DIAGNOSIS — N28.9 RENAL INSUFFICIENCY: ICD-10-CM

## 2022-08-02 DIAGNOSIS — Z91.041 ALLERGY TO INTRAVENOUS CONTRAST: ICD-10-CM

## 2022-08-02 DIAGNOSIS — I25.110 CORONARY ARTERY DISEASE INVOLVING NATIVE HEART WITH UNSTABLE ANGINA PECTORIS, UNSPECIFIED VESSEL OR LESION TYPE: ICD-10-CM

## 2022-08-02 DIAGNOSIS — R07.2 PRECORDIAL CHEST PAIN: Primary | ICD-10-CM

## 2022-08-02 LAB
ALBUMIN SERPL-MCNC: 4.2 G/DL (ref 3.5–5.2)
ALBUMIN/GLOB SERPL: 2 G/DL
ALP SERPL-CCNC: 63 U/L (ref 39–117)
ALT SERPL W P-5'-P-CCNC: 21 U/L (ref 1–41)
ANION GAP SERPL CALCULATED.3IONS-SCNC: 7.9 MMOL/L (ref 5–15)
AST SERPL-CCNC: 13 U/L (ref 1–40)
BASOPHILS # BLD AUTO: 0.04 10*3/MM3 (ref 0–0.2)
BASOPHILS NFR BLD AUTO: 0.6 % (ref 0–1.5)
BILIRUB SERPL-MCNC: 0.2 MG/DL (ref 0–1.2)
BUN SERPL-MCNC: 14 MG/DL (ref 8–23)
BUN/CREAT SERPL: 9.2 (ref 7–25)
CALCIUM SPEC-SCNC: 9.1 MG/DL (ref 8.6–10.5)
CHLORIDE SERPL-SCNC: 107 MMOL/L (ref 98–107)
CO2 SERPL-SCNC: 23.1 MMOL/L (ref 22–29)
CREAT SERPL-MCNC: 1.53 MG/DL (ref 0.76–1.27)
DEPRECATED RDW RBC AUTO: 42.3 FL (ref 37–54)
EGFRCR SERPLBLD CKD-EPI 2021: 49.2 ML/MIN/1.73
EOSINOPHIL # BLD AUTO: 0.14 10*3/MM3 (ref 0–0.4)
EOSINOPHIL NFR BLD AUTO: 2.1 % (ref 0.3–6.2)
ERYTHROCYTE [DISTWIDTH] IN BLOOD BY AUTOMATED COUNT: 12.9 % (ref 12.3–15.4)
GLOBULIN UR ELPH-MCNC: 2.1 GM/DL
GLUCOSE SERPL-MCNC: 99 MG/DL (ref 65–99)
HCT VFR BLD AUTO: 41 % (ref 37.5–51)
HGB BLD-MCNC: 14.3 G/DL (ref 13–17.7)
HOLD SPECIMEN: NORMAL
HOLD SPECIMEN: NORMAL
IMM GRANULOCYTES # BLD AUTO: 0.01 10*3/MM3 (ref 0–0.05)
IMM GRANULOCYTES NFR BLD AUTO: 0.1 % (ref 0–0.5)
INR PPP: 1.17 (ref 0.9–1.1)
LYMPHOCYTES # BLD AUTO: 2.31 10*3/MM3 (ref 0.7–3.1)
LYMPHOCYTES NFR BLD AUTO: 33.9 % (ref 19.6–45.3)
MCH RBC QN AUTO: 31.2 PG (ref 26.6–33)
MCHC RBC AUTO-ENTMCNC: 34.9 G/DL (ref 31.5–35.7)
MCV RBC AUTO: 89.3 FL (ref 79–97)
MONOCYTES # BLD AUTO: 0.54 10*3/MM3 (ref 0.1–0.9)
MONOCYTES NFR BLD AUTO: 7.9 % (ref 5–12)
NEUTROPHILS NFR BLD AUTO: 3.77 10*3/MM3 (ref 1.7–7)
NEUTROPHILS NFR BLD AUTO: 55.4 % (ref 42.7–76)
NRBC BLD AUTO-RTO: 0 /100 WBC (ref 0–0.2)
PLATELET # BLD AUTO: 148 10*3/MM3 (ref 140–450)
PMV BLD AUTO: 10.7 FL (ref 6–12)
POTASSIUM SERPL-SCNC: 4.1 MMOL/L (ref 3.5–5.2)
PROT SERPL-MCNC: 6.3 G/DL (ref 6–8.5)
PROTHROMBIN TIME: 14.7 SECONDS (ref 11.7–14.2)
QT INTERVAL: 392 MS
RBC # BLD AUTO: 4.59 10*6/MM3 (ref 4.14–5.8)
SARS-COV-2 ORF1AB RESP QL NAA+PROBE: NOT DETECTED
SODIUM SERPL-SCNC: 138 MMOL/L (ref 136–145)
TROPONIN T SERPL-MCNC: <0.01 NG/ML (ref 0–0.03)
TROPONIN T SERPL-MCNC: <0.01 NG/ML (ref 0–0.03)
WBC NRBC COR # BLD: 6.81 10*3/MM3 (ref 3.4–10.8)
WHOLE BLOOD HOLD COAG: NORMAL
WHOLE BLOOD HOLD SPECIMEN: NORMAL

## 2022-08-02 PROCEDURE — 93010 ELECTROCARDIOGRAM REPORT: CPT | Performed by: INTERNAL MEDICINE

## 2022-08-02 PROCEDURE — 71046 X-RAY EXAM CHEST 2 VIEWS: CPT

## 2022-08-02 PROCEDURE — C9803 HOPD COVID-19 SPEC COLLECT: HCPCS

## 2022-08-02 PROCEDURE — 63710000001 PREDNISONE PER 5 MG: Performed by: INTERNAL MEDICINE

## 2022-08-02 PROCEDURE — 25010000002 METHYLPREDNISOLONE PER 40 MG: Performed by: INTERNAL MEDICINE

## 2022-08-02 PROCEDURE — 84484 ASSAY OF TROPONIN QUANT: CPT

## 2022-08-02 PROCEDURE — 93005 ELECTROCARDIOGRAM TRACING: CPT

## 2022-08-02 PROCEDURE — 96374 THER/PROPH/DIAG INJ IV PUSH: CPT

## 2022-08-02 PROCEDURE — G0378 HOSPITAL OBSERVATION PER HR: HCPCS

## 2022-08-02 PROCEDURE — 99284 EMERGENCY DEPT VISIT MOD MDM: CPT

## 2022-08-02 PROCEDURE — 85025 COMPLETE CBC W/AUTO DIFF WBC: CPT

## 2022-08-02 PROCEDURE — 85610 PROTHROMBIN TIME: CPT

## 2022-08-02 PROCEDURE — 80053 COMPREHEN METABOLIC PANEL: CPT

## 2022-08-02 PROCEDURE — 36415 COLL VENOUS BLD VENIPUNCTURE: CPT

## 2022-08-02 PROCEDURE — U0004 COV-19 TEST NON-CDC HGH THRU: HCPCS | Performed by: EMERGENCY MEDICINE

## 2022-08-02 RX ORDER — ASPIRIN 81 MG/1
324 TABLET, CHEWABLE ORAL ONCE
Status: COMPLETED | OUTPATIENT
Start: 2022-08-02 | End: 2022-08-02

## 2022-08-02 RX ORDER — FAMOTIDINE 20 MG/1
40 TABLET, FILM COATED ORAL NIGHTLY
Status: COMPLETED | OUTPATIENT
Start: 2022-08-02 | End: 2022-08-02

## 2022-08-02 RX ORDER — DIPHENHYDRAMINE HCL 25 MG
50 CAPSULE ORAL ONCE
Status: COMPLETED | OUTPATIENT
Start: 2022-08-03 | End: 2022-08-03

## 2022-08-02 RX ORDER — DIPHENHYDRAMINE HYDROCHLORIDE 50 MG/ML
50 INJECTION INTRAMUSCULAR; INTRAVENOUS ONCE
Status: DISCONTINUED | OUTPATIENT
Start: 2022-08-02 | End: 2022-08-02

## 2022-08-02 RX ORDER — SODIUM CHLORIDE 0.9 % (FLUSH) 0.9 %
10 SYRINGE (ML) INJECTION AS NEEDED
Status: DISCONTINUED | OUTPATIENT
Start: 2022-08-02 | End: 2022-08-04 | Stop reason: HOSPADM

## 2022-08-02 RX ORDER — METHYLPREDNISOLONE SODIUM SUCCINATE 40 MG/ML
40 INJECTION, POWDER, LYOPHILIZED, FOR SOLUTION INTRAMUSCULAR; INTRAVENOUS EVERY 4 HOURS
Status: COMPLETED | OUTPATIENT
Start: 2022-08-02 | End: 2022-08-03

## 2022-08-02 RX ORDER — DIPHENHYDRAMINE HCL 25 MG
50 CAPSULE ORAL ONCE
Status: DISCONTINUED | OUTPATIENT
Start: 2022-08-02 | End: 2022-08-02

## 2022-08-02 RX ORDER — PREDNISONE 50 MG/1
50 TABLET ORAL EVERY 6 HOURS
Status: COMPLETED | OUTPATIENT
Start: 2022-08-02 | End: 2022-08-03

## 2022-08-02 RX ORDER — DIPHENHYDRAMINE HYDROCHLORIDE 50 MG/ML
50 INJECTION INTRAMUSCULAR; INTRAVENOUS ONCE
Status: DISCONTINUED | OUTPATIENT
Start: 2022-08-03 | End: 2022-08-04 | Stop reason: HOSPADM

## 2022-08-02 RX ADMIN — PREDNISONE 50 MG: 50 TABLET ORAL at 20:35

## 2022-08-02 RX ADMIN — FAMOTIDINE 40 MG: 20 TABLET ORAL at 20:35

## 2022-08-02 RX ADMIN — METHYLPREDNISOLONE SODIUM SUCCINATE 40 MG: 40 INJECTION, POWDER, FOR SOLUTION INTRAMUSCULAR; INTRAVENOUS at 20:35

## 2022-08-02 RX ADMIN — ASPIRIN 324 MG: 81 TABLET, CHEWABLE ORAL at 14:33

## 2022-08-02 NOTE — NURSING NOTE
Spoke with Dr. Crook, aware of new admit.New orders for cardiac diet and npo after MN rec'd and noted.

## 2022-08-02 NOTE — ED TRIAGE NOTES
Pt arrives via PV with c/o CP since Saturday. Pt reports that chest pain comes and goes, pt reports dizziness and HA associated with symptoms. Pt endorses SOB with exertion.   Hx of heart cath in March.  Pt reports a cardiologist appt tma, felt it was emergent. Pt is unsure if he is on a blood thinner.   
contact guard

## 2022-08-02 NOTE — PROGRESS NOTES
Clinical Pharmacy Services: Medication History    Rishi Galicia is a 67 y.o. male presenting to [facility] for   Chief Complaint   Patient presents with   • Chest Pain       He  has a past medical history of CAD (coronary artery disease), Chronic kidney disease, GERD (gastroesophageal reflux disease) (05/14/2019), H/O echocardiogram (07/18/2018), History of heart artery stent (03/31/2022), Hyperlipidemia, Hypertension, Hypothyroidism, Peripheral vascular disease (HCC), Renal disorder, and Tobacco abuse.    Allergies as of 08/02/2022 - Reviewed 08/02/2022   Allergen Reaction Noted   • Contrast dye Hives and Itching 05/13/2019   • Latex Hives and Itching 08/02/2022   • Statins Provider Review Needed 10/13/2021   • Ranolazine Other (See Comments) and Provider Review Needed 12/15/2020       Medication information was obtained from: Patient   Pharmacy and Phone Number:     Prior to Admission Medications     Prescriptions Last Dose Informant Patient Reported? Taking?    acetaminophen (TYLENOL) 325 MG tablet 8/1/2022 Self No Yes    Take 2 tablets by mouth Every 4 (Four) Hours As Needed for Mild Pain  or Fever (temperature greater than 101F).    Patient taking differently:  Take 650 mg by mouth As Needed for Mild Pain  or Fever (temperature greater than 101F).    alfuzosin (UROXATRAL) 10 MG 24 hr tablet 8/2/2022 Self Yes Yes    Take 10 mg by mouth Every Night.    amLODIPine (NORVASC) 2.5 MG tablet 8/2/2022 Self No Yes    Take 1 tablet by mouth Daily.    clopidogrel (PLAVIX) 75 MG tablet 8/2/2022 Self No Yes    TAKE 1 TABLET BY MOUTH  DAILY    Evolocumab (REPATHA) solution auto-injector SureClick injection  Self Yes Yes    Inject 1 syringe under the skin into the appropriate area as directed Every 14 (Fourteen) Days.    ezetimibe (ZETIA) 10 MG tablet 8/2/2022 Self Yes Yes    Take 10 mg by mouth Daily.    isosorbide mononitrate (IMDUR) 30 MG 24 hr tablet 8/2/2022 Self No Yes    Take 1 tablet by mouth Daily.  Take one 60mg tab and one 30mg tab together daily    isosorbide mononitrate (IMDUR) 60 MG 24 hr tablet 8/2/2022 Self No Yes    Take 1 tablet by mouth Daily. Take one 60mg tab and one 30mg tab together daily    levothyroxine (SYNTHROID, LEVOTHROID) 137 MCG tablet 8/1/2022 Self Yes Yes    Take 1 tablet by mouth See Admin Instructions. Takes 6 days a week: Monday, Tuesday, Wednesday, Thursday, Friday, and Saturday    losartan (COZAAR) 50 MG tablet 8/2/2022 Self No Yes    Take 0.5 tablets by mouth Daily.    pantoprazole (PROTONIX) 40 MG EC tablet 8/2/2022 Self No Yes    Take 1 tablet by mouth Daily.    nitroglycerin (NITROSTAT) 0.4 MG SL tablet  Self No No    1 under the tongue as needed for angina, may repeat q5mins for up three doses    Patient taking differently:  Place 0.4 mg under the tongue Every 5 (Five) Minutes As Needed.            Medication notes:     This medication list is complete to the best of my knowledge as of 8/2/2022    Please call if questions.    Aden Maloney  Medication History Technician  [phone]    8/2/2022 15:22 EDT

## 2022-08-02 NOTE — ED PROVIDER NOTES
EMERGENCY DEPARTMENT ENCOUNTER    Room Number:  20/20  Date of encounter:  8/2/2022  PCP: Quang Plasencia MD  Patient Care Team:  Quang Plasencia MD as PCP - General (Internal Medicine)  Jennifer Pérez MD as Consulting Physician (Endocrinology)  Yunior Crook MD as Consulting Physician (Cardiology)   Historian: Patient, family    HPI:  Chief Complaint: Chest pain  A complete HPI/ROS/PMH/PSH/SH/FH are unobtainable due to: Nothing    Context: Rishi Galicia is a 67 y.o. male who presents to the ED c/o chest pain.  He reports for the last 3 days he has been having exertional chest pain.  He reports that when he walks he has to stop because he gets severe pain in his left chest with shortness of breath, nausea, and diaphoresis.  He states that the symptoms resolved when he is at rest.  He states it feels exactly like his prior cardiac disease.  He states that he sees Dr. Barron who told him if he ever had pain like this he needed to come to the emergency room immediately.  He currently only reports minimal pain.  He has not had aspirin today.  He reports his last cardiac cath was in March of this year.  He reports he had a stent at that time for the same symptoms he has today.    Prior record review: Discharge summary 4/1/2022 by cardiology with severe three-vessel coronary artery disease.    Conclusions:     1.  Severely elevated left heart filling pressures with preserved LV systolic function  2.  Normal epicardial anatomy with severe three-vessel coronary artery disease  3.  Severe disease of the culprit SVG graft to obtuse marginal branch.  4.  Widely patent LIMA to the LAD with nonobstructive disease within the system  5.  Chronic total occlusion of the right coronary artery which is nonrevascularizable due to absence of distal targets.  6.  Successful PCI and stenting of the SVG graft as described above.    PAST MEDICAL HISTORY  Active Ambulatory Problems     Diagnosis Date Noted   • CKD  (chronic kidney disease) stage 1, GFR 90 ml/min or greater 05/14/2019   • Hyperlipidemia LDL goal <70 05/14/2019   • Hypothyroidism 05/14/2019   • GERD (gastroesophageal reflux disease) 05/14/2019   • Tobacco abuse    • Visual color changes 05/18/2020   • Fatigue 06/26/2020   • Orthostatic dizziness 06/26/2020   • Daytime somnolence 08/14/2020   • Essential hypertension 08/14/2020   • History of coronary artery bypass graft 10/23/2020   • Coronary artery disease due to lipid rich plaque 12/03/2020   • Grade II diastolic dysfunction 01/05/2021   • Other chest pain 03/29/2021     Resolved Ambulatory Problems     Diagnosis Date Noted   • Coronary artery disease involving native coronary artery of native heart with unstable angina pectoris (HCC) 05/14/2019   • Coronary artery disease of native artery of native heart with stable angina pectoris (HCC) 10/26/2020   • CAD S/P percutaneous coronary angioplasty 11/04/2020     Past Medical History:   Diagnosis Date   • CAD (coronary artery disease)    • Chronic kidney disease    • H/O echocardiogram 07/18/2018   • History of heart artery stent 03/31/2022   • Hyperlipidemia    • Hypertension    • Peripheral vascular disease (HCC)    • Renal disorder        The patient has started, but not completed, their COVID-19 vaccination series.    PAST SURGICAL HISTORY  Past Surgical History:   Procedure Laterality Date   • CARDIAC CATHETERIZATION  07/20/2018    Native 3 vessel disease.  Patent stent in the LCx.  native  LAD, non-dominant RCA has .  Patent LIMA to LAD, SVG-OM grafts.     • CARDIAC CATHETERIZATION N/A 11/03/2020    Procedure: Left Heart Cath;  Surgeon: Yunior Crook MD;  Location:  [facility];  Service: Cardiovascular;  Laterality: N/A;   • CARDIAC CATHETERIZATION N/A 11/03/2020    Procedure: Coronary angiography;  Surgeon: Yunior Crook MD;  Location:  [facility];  Service: Cardiovascular;  Laterality: N/A;   • CARDIAC  CATHETERIZATION N/A 11/03/2020    Procedure: Left ventriculography;  Surgeon: Yunior Crook MD;  Location:  [facility];  Service: Cardiovascular;  Laterality: N/A;   • CARDIAC CATHETERIZATION N/A 11/03/2020    Procedure: Saphenous Vein Graft;  Surgeon: Yunior Crook MD;  Location:  [facility];  Service: Cardiovascular;  Laterality: N/A;   • CARDIAC CATHETERIZATION N/A 11/03/2020    Procedure: Percutaneous Coronary Intervention;  Surgeon: Yunior Crook MD;  Location:  [facility];  Service: Cardiovascular;  Laterality: N/A;   • CARDIAC CATHETERIZATION N/A 05/04/2021    Procedure: Left Heart Cath;  Surgeon: Andrew Ocampo MD;  Location: [facility];  Service: Cardiology;  Laterality: N/A;   • CARDIAC CATHETERIZATION N/A 05/04/2021    Procedure: Coronary angiography;  Surgeon: Andrew Ocampo MD;  Location: [facility];  Service: Cardiology;  Laterality: N/A;   • CARDIAC CATHETERIZATION N/A 05/04/2021    Procedure: Left ventriculography;  Surgeon: Andrew Ocampo MD;  Location: [facility] C[facility] LOC[facility];  Service: Cardiology;  Laterality: N/A;   • CARDIAC CATHETERIZATION  05/04/2021    Procedure: Saphenous Vein Graft;  Surgeon: Andrew Ocampo MD;  Location: [facility];  Service: Cardiology;;   • CARDIAC CATHETERIZATION N/A 03/31/2022    Procedure: Left Heart Cath;  Surgeon: Andrew Ocampo MD;  Location: [facility] C[facility];  Service: Cardiology;  Laterality: N/A;   • CARDIAC CATHETERIZATION N/A 03/31/2022    Procedure: Left ventriculography;  Surgeon: Andrew Ocampo MD;  Location: [facility] C[facility] LOC[facility];  Service: Cardiology;  Laterality: N/A;   • CARDIAC CATHETERIZATION N/A 03/31/2022    Procedure: Coronary angiography;  Surgeon: Andrew Ocampo MD;  Location: [facility] C[facility] LOC[facility];  Service:  Cardiology;  Laterality: N/A;   • CARDIAC CATHETERIZATION  03/31/2022    Procedure: Saphenous Vein Graft;  Surgeon: Andrew Ocampo MD;  Location:  [facility];  Service: Cardiology;;   • CARDIAC CATHETERIZATION N/A 03/31/2022    Procedure: Stent JAMES bypass graft;  Surgeon: Andrew Ocampo MD;  Location:  [facility];  Service: Cardiology;  Laterality: N/A;   • CARDIAC SURGERY     • CORONARY ANGIOPLASTY WITH STENT PLACEMENT     • CORONARY ANGIOPLASTY WITH STENT PLACEMENT      4/25/16 - SYNERGY stent to the distal left PDA  and distal and mid LIMA to LAD.  .  12/2/15 -  PROMUS stent ot the first obtuse marginal branch via SVG.     • CORONARY ARTERY BYPASS GRAFT  2009   • CORONARY STENT PLACEMENT Left 03/31/2022    Xience stent of the SVG to the OM1   • ILIAC ARTERY STENT  2016    S/P right common iliac artery stent placement and balloon angioplasty with no significant residual stenosis and balloon angioplasty of the left tibioperoneal trunk with no significant residual stenosis.  Performed b Dr. Carlo Grijalva.   • KNEE CARTILAGE SURGERY Right          FAMILY HISTORY  Family History   Problem Relation Age of Onset   • Hyperlipidemia Brother    • Heart attack Mother          SOCIAL HISTORY  Social History     Socioeconomic History   • Marital status: Single   Tobacco Use   • Smoking status: Current Every Day Smoker     Packs/day: 0.50     Years: 40.00     Pack years: 20.00     Types: Cigarettes   • Smokeless tobacco: Never Used   Substance and Sexual Activity   • Alcohol use: No   • Drug use: No   • Sexual activity: Defer         ALLERGIES  Contrast dye, Latex, Statins, and Ranolazine        REVIEW OF SYSTEMS  Review of Systems   Positive chest pain, positive shortness of breath, positive nausea, positive diaphoresis, no fever, no chills, no abdominal pain, no syncope, no palpitations  All systems reviewed and negative except for those discussed in HPI.        PHYSICAL EXAM    I have reviewed the triage vital signs and nursing notes.    ED Triage Vitals   Temp Heart Rate Resp BP SpO2   08/02/22 1311 08/02/22 1311 08/02/22 1311 08/02/22 1322 08/02/22 1311   96 °F (35.6 °C) 84 16 139/75 96 %      Temp src Heart Rate Source Patient Position BP Location FiO2 (%)   08/02/22 1311 -- -- -- --   Tympanic           Physical Exam  GENERAL: Awake, alert, no acute distress  SKIN: Warm, dry  HENT: Normocephalic, atraumatic  EYES: no scleral icterus  CV: regular rhythm, regular rate  RESPIRATORY: normal effort, lungs clear  ABDOMEN: soft, nontender, nondistended  MUSCULOSKELETAL: no deformity  NEURO: alert, moves all extremities, follows commands          LAB RESULTS  Recent Results (from the past 24 hour(s))   ECG 12 Lead    Collection Time: 08/02/22  1:17 PM   Result Value Ref Range    QT Interval 392 ms   Comprehensive Metabolic Panel    Collection Time: 08/02/22  1:28 PM    Specimen: Blood   Result Value Ref Range    Glucose 99 65 - 99 mg/dL    BUN 14 8 - 23 mg/dL    Creatinine 1.53 (H) 0.76 - 1.27 mg/dL    Sodium 138 136 - 145 mmol/L    Potassium 4.1 3.5 - 5.2 mmol/L    Chloride 107 98 - 107 mmol/L    CO2 23.1 22.0 - 29.0 mmol/L    Calcium 9.1 8.6 - 10.5 mg/dL    Total Protein 6.3 6.0 - 8.5 g/dL    Albumin 4.20 3.50 - 5.20 g/dL    ALT (SGPT) 21 1 - 41 U/L    AST (SGOT) 13 1 - 40 U/L    Alkaline Phosphatase 63 39 - 117 U/L    Total Bilirubin 0.2 0.0 - 1.2 mg/dL    Globulin 2.1 gm/dL    A/G Ratio 2.0 g/dL    BUN/Creatinine Ratio 9.2 7.0 - 25.0    Anion Gap 7.9 5.0 - 15.0 mmol/L    eGFR 49.2 (L) >60.0 mL/min/1.73   Troponin    Collection Time: 08/02/22  1:28 PM    Specimen: Blood   Result Value Ref Range    Troponin T <0.010 0.000 - 0.030 ng/mL   Protime-INR    Collection Time: 08/02/22  1:28 PM    Specimen: Blood   Result Value Ref Range    Protime 14.7 (H) 11.7 - 14.2 Seconds    INR 1.17 (H) 0.90 - 1.10   Green Top (Gel)    Collection Time: 08/02/22  1:28 PM   Result Value  Ref Range    Extra Tube Hold for add-ons.    Lavender Top    Collection Time: 08/02/22  1:28 PM   Result Value Ref Range    Extra Tube hold for add-on    Gold Top - SST    Collection Time: 08/02/22  1:28 PM   Result Value Ref Range    Extra Tube Hold for add-ons.    Light Blue Top    Collection Time: 08/02/22  1:28 PM   Result Value Ref Range    Extra Tube Hold for add-ons.    CBC Auto Differential    Collection Time: 08/02/22  1:28 PM    Specimen: Blood   Result Value Ref Range    WBC 6.81 3.40 - 10.80 10*3/mm3    RBC 4.59 4.14 - 5.80 10*6/mm3    Hemoglobin 14.3 13.0 - 17.7 g/dL    Hematocrit 41.0 37.5 - 51.0 %    MCV 89.3 79.0 - 97.0 fL    MCH 31.2 26.6 - 33.0 pg    MCHC 34.9 31.5 - 35.7 g/dL    RDW 12.9 12.3 - 15.4 %    RDW-SD 42.3 37.0 - 54.0 fl    MPV 10.7 6.0 - 12.0 fL    Platelets 148 140 - 450 10*3/mm3    Neutrophil % 55.4 42.7 - 76.0 %    Lymphocyte % 33.9 19.6 - 45.3 %    Monocyte % 7.9 5.0 - 12.0 %    Eosinophil % 2.1 0.3 - 6.2 %    Basophil % 0.6 0.0 - 1.5 %    Immature Grans % 0.1 0.0 - 0.5 %    Neutrophils, Absolute 3.77 1.70 - 7.00 10*3/mm3    Lymphocytes, Absolute 2.31 0.70 - 3.10 10*3/mm3    Monocytes, Absolute 0.54 0.10 - 0.90 10*3/mm3    Eosinophils, Absolute 0.14 0.00 - 0.40 10*3/mm3    Basophils, Absolute 0.04 0.00 - 0.20 10*3/mm3    Immature Grans, Absolute 0.01 0.00 - 0.05 10*3/mm3    nRBC 0.0 0.0 - 0.2 /100 WBC       Ordered the above labs and independently reviewed the results.        RADIOLOGY  XR Chest 2 View    Result Date: 8/2/2022  XR CHEST 2 VW-  08/02/2022  HISTORY: Chest pain.  Heart size is within normal limits. Lungs appear clear. Sternotomy wires are seen. There are coronary calcification or faintly visualized coronary stent.  Bones and soft tissues are unremarkable.      1. No acute process.  This report was finalized on 8/2/2022 1:47 PM by Dr. Sujit Ortega M.D.        I ordered the above noted radiological studies. Reviewed by me and discussed with radiologist.  See  dictation for official radiology interpretation.      PROCEDURES    Procedures      MEDICATIONS GIVEN IN ER    Medications   sodium chloride 0.9 % flush 10 mL (has no administration in time range)   aspirin chewable tablet 324 mg (324 mg Oral Given 8/2/22 1433)         PROGRESS, DATA ANALYSIS, CONSULTS, AND MEDICAL DECISION MAKING    All labs have been independently reviewed by me.  All radiology studies have been reviewed by me and discussed with radiologist dictating the report.   EKG's independently viewed and interpreted by me.  Discussion below represents my analysis of pertinent findings related to patient's condition, differential diagnosis, treatment plan and final disposition.    Differential diagnosis includes but is not limited to STEMI, non-STEMI, unstable angina, pneumothorax, acute aortic syndrome.    ED Course as of 08/02/22 1447   Tue Aug 02, 2022   1434 EKG          EKG time: 1317  Rhythm/Rate: Normal sinus, rate 78  P waves and NE: Normal P, normal NE  QRS, axis: Right bundle branch block  ST and T waves: No acute    Interpreted Contemporaneously by me, independently viewed  No prior EKG available for comparison   [TR]   1445 I spoke with Dr. Yunior Barron who will admit him to his service.  He will work on getting him a cath.  The patient's symptoms are classic for cardiac disease and he has known cardiac disease.  I reviewed the work-up and findings with the patient at the bedside.  Answered all questions.  Plan admission.  He is agreeable. [TR]   1446 Troponin T: <0.010 [TR]   1446 Hemoglobin: 14.3 [TR]   1446 Creatinine(!): 1.53  His creatinine is at the baseline. [TR]   1447 Heart Score Calculation:    History slightly suspicious: 0  History moderately suspicious: +1  History highly suspicious: +2    EKG normal: 0  EKG nonspecific repolarization disturbance: +1  EKG significant ST deviation: +2    Age less than 45: 0  Age 45-64: +1  Age greater than 65: +2    No known risk factors: 0  1-2  risk factors: +1  Greater than 3 risk factors: +2    Initial troponin less than the normal limit: 0  Initial troponin I-III times normal limit: +1  Initial troponin greater than 3 times normal limit: +2    Total score: 6 [TR]      ED Course User Index  [TR] Terry Perez MD           PPE: The patient wore a mask and I wore an N95 mask throughout the entire patient encounter.       AS OF 14:47 EDT VITALS:    BP - 119/71  HR - 63  TEMP - 96 °F (35.6 °C) (Tympanic)  O2 SATS - 98%        DIAGNOSIS  Final diagnoses:   Precordial chest pain   Coronary artery disease involving native heart with unstable angina pectoris, unspecified vessel or lesion type (HCC)   Renal insufficiency   Allergy to intravenous contrast         DISPOSITION  ED Disposition     ED Disposition   Decision to Admit    Condition   --    Comment   --                   Terry Perez MD  08/02/22 [id number]

## 2022-08-03 ENCOUNTER — APPOINTMENT (OUTPATIENT)
Dept: CARDIOLOGY | Facility: HOSPITAL | Age: 67
End: 2022-08-03

## 2022-08-03 PROBLEM — N18.31 STAGE 3A CHRONIC KIDNEY DISEASE (HCC): Status: ACTIVE | Noted: 2022-08-03

## 2022-08-03 PROBLEM — Z91.041 ALLERGY TO CONTRAST MEDIA (USED FOR DIAGNOSTIC X-RAYS): Status: ACTIVE | Noted: 2022-08-03

## 2022-08-03 PROBLEM — N18.1 CKD (CHRONIC KIDNEY DISEASE) STAGE 1, GFR 90 ML/MIN OR GREATER: Status: RESOLVED | Noted: 2019-05-14 | Resolved: 2022-08-03

## 2022-08-03 LAB
ANION GAP SERPL CALCULATED.3IONS-SCNC: 14.5 MMOL/L (ref 5–15)
BUN SERPL-MCNC: 14 MG/DL (ref 8–23)
BUN/CREAT SERPL: 10.3 (ref 7–25)
CALCIUM SPEC-SCNC: 9.3 MG/DL (ref 8.6–10.5)
CHLORIDE SERPL-SCNC: 107 MMOL/L (ref 98–107)
CO2 SERPL-SCNC: 19.5 MMOL/L (ref 22–29)
CREAT SERPL-MCNC: 1.36 MG/DL (ref 0.76–1.27)
EGFRCR SERPLBLD CKD-EPI 2021: 56.7 ML/MIN/1.73
GLUCOSE SERPL-MCNC: 130 MG/DL (ref 65–99)
POTASSIUM SERPL-SCNC: 4.2 MMOL/L (ref 3.5–5.2)
QT INTERVAL: 410 MS
SODIUM SERPL-SCNC: 141 MMOL/L (ref 136–145)
TROPONIN T SERPL-MCNC: <0.01 NG/ML (ref 0–0.03)

## 2022-08-03 PROCEDURE — 96376 TX/PRO/DX INJ SAME DRUG ADON: CPT

## 2022-08-03 PROCEDURE — 84484 ASSAY OF TROPONIN QUANT: CPT | Performed by: INTERNAL MEDICINE

## 2022-08-03 PROCEDURE — 93306 TTE W/DOPPLER COMPLETE: CPT | Performed by: INTERNAL MEDICINE

## 2022-08-03 PROCEDURE — 99220 PR INITIAL OBSERVATION CARE/DAY 70 MINUTES: CPT | Performed by: INTERNAL MEDICINE

## 2022-08-03 PROCEDURE — 25010000002 METHYLPREDNISOLONE PER 40 MG: Performed by: INTERNAL MEDICINE

## 2022-08-03 PROCEDURE — 93459 L HRT ART/GRFT ANGIO: CPT | Performed by: INTERNAL MEDICINE

## 2022-08-03 PROCEDURE — G0378 HOSPITAL OBSERVATION PER HR: HCPCS

## 2022-08-03 PROCEDURE — 0 IOPAMIDOL PER 1 ML: Performed by: INTERNAL MEDICINE

## 2022-08-03 PROCEDURE — 25010000002 HEPARIN (PORCINE) PER 1000 UNITS: Performed by: INTERNAL MEDICINE

## 2022-08-03 PROCEDURE — 80048 BASIC METABOLIC PNL TOTAL CA: CPT | Performed by: INTERNAL MEDICINE

## 2022-08-03 PROCEDURE — 63710000001 PREDNISONE PER 5 MG: Performed by: INTERNAL MEDICINE

## 2022-08-03 PROCEDURE — 25010000002 FENTANYL CITRATE (PF) 50 MCG/ML SOLUTION: Performed by: INTERNAL MEDICINE

## 2022-08-03 PROCEDURE — 99152 MOD SED SAME PHYS/QHP 5/>YRS: CPT | Performed by: INTERNAL MEDICINE

## 2022-08-03 PROCEDURE — 93306 TTE W/DOPPLER COMPLETE: CPT

## 2022-08-03 PROCEDURE — 93005 ELECTROCARDIOGRAM TRACING: CPT | Performed by: INTERNAL MEDICINE

## 2022-08-03 PROCEDURE — 63710000001 DIPHENHYDRAMINE PER 50 MG: Performed by: INTERNAL MEDICINE

## 2022-08-03 PROCEDURE — C1769 GUIDE WIRE: HCPCS | Performed by: INTERNAL MEDICINE

## 2022-08-03 PROCEDURE — 25010000002 MIDAZOLAM PER 1 MG: Performed by: INTERNAL MEDICINE

## 2022-08-03 PROCEDURE — 99153 MOD SED SAME PHYS/QHP EA: CPT | Performed by: INTERNAL MEDICINE

## 2022-08-03 PROCEDURE — C1894 INTRO/SHEATH, NON-LASER: HCPCS | Performed by: INTERNAL MEDICINE

## 2022-08-03 PROCEDURE — 25010000002 PERFLUTREN (DEFINITY) 8.476 MG IN SODIUM CHLORIDE (PF) 0.9 % 10 ML INJECTION: Performed by: INTERNAL MEDICINE

## 2022-08-03 PROCEDURE — 93010 ELECTROCARDIOGRAM REPORT: CPT | Performed by: INTERNAL MEDICINE

## 2022-08-03 RX ORDER — LIDOCAINE HYDROCHLORIDE 20 MG/ML
INJECTION, SOLUTION INFILTRATION; PERINEURAL AS NEEDED
Status: DISCONTINUED | OUTPATIENT
Start: 2022-08-03 | End: 2022-08-03 | Stop reason: HOSPADM

## 2022-08-03 RX ORDER — AMLODIPINE BESYLATE 2.5 MG/1
2.5 TABLET ORAL DAILY
Status: DISCONTINUED | OUTPATIENT
Start: 2022-08-03 | End: 2022-08-04

## 2022-08-03 RX ORDER — MIDAZOLAM HYDROCHLORIDE 1 MG/ML
INJECTION INTRAMUSCULAR; INTRAVENOUS AS NEEDED
Status: DISCONTINUED | OUTPATIENT
Start: 2022-08-03 | End: 2022-08-03 | Stop reason: HOSPADM

## 2022-08-03 RX ORDER — EZETIMIBE 10 MG/1
10 TABLET ORAL DAILY
Status: DISCONTINUED | OUTPATIENT
Start: 2022-08-04 | End: 2022-08-04 | Stop reason: HOSPADM

## 2022-08-03 RX ORDER — ISOSORBIDE MONONITRATE 60 MG/1
60 TABLET, EXTENDED RELEASE ORAL DAILY
Status: DISCONTINUED | OUTPATIENT
Start: 2022-08-03 | End: 2022-08-04

## 2022-08-03 RX ORDER — PANTOPRAZOLE SODIUM 40 MG/1
40 TABLET, DELAYED RELEASE ORAL DAILY
Status: DISCONTINUED | OUTPATIENT
Start: 2022-08-03 | End: 2022-08-04 | Stop reason: HOSPADM

## 2022-08-03 RX ORDER — FENTANYL CITRATE 50 UG/ML
INJECTION, SOLUTION INTRAMUSCULAR; INTRAVENOUS AS NEEDED
Status: DISCONTINUED | OUTPATIENT
Start: 2022-08-03 | End: 2022-08-03 | Stop reason: HOSPADM

## 2022-08-03 RX ORDER — NITROGLYCERIN 0.4 MG/1
0.4 TABLET SUBLINGUAL
Status: DISCONTINUED | OUTPATIENT
Start: 2022-08-03 | End: 2022-08-04 | Stop reason: HOSPADM

## 2022-08-03 RX ORDER — ISOSORBIDE MONONITRATE 30 MG/1
30 TABLET, EXTENDED RELEASE ORAL DAILY
Status: DISCONTINUED | OUTPATIENT
Start: 2022-08-03 | End: 2022-08-04

## 2022-08-03 RX ORDER — CLOPIDOGREL BISULFATE 75 MG/1
75 TABLET ORAL DAILY
Status: DISCONTINUED | OUTPATIENT
Start: 2022-08-03 | End: 2022-08-04 | Stop reason: HOSPADM

## 2022-08-03 RX ORDER — SODIUM CHLORIDE 9 MG/ML
75 INJECTION, SOLUTION INTRAVENOUS CONTINUOUS
Status: DISCONTINUED | OUTPATIENT
Start: 2022-08-03 | End: 2022-08-04

## 2022-08-03 RX ORDER — LOSARTAN POTASSIUM 25 MG/1
25 TABLET ORAL DAILY
Status: DISCONTINUED | OUTPATIENT
Start: 2022-08-03 | End: 2022-08-04 | Stop reason: HOSPADM

## 2022-08-03 RX ORDER — LEVOTHYROXINE SODIUM 137 UG/1
137 TABLET ORAL
Status: DISCONTINUED | OUTPATIENT
Start: 2022-08-03 | End: 2022-08-04 | Stop reason: HOSPADM

## 2022-08-03 RX ORDER — TAMSULOSIN HYDROCHLORIDE 0.4 MG/1
0.4 CAPSULE ORAL NIGHTLY
Status: DISCONTINUED | OUTPATIENT
Start: 2022-08-03 | End: 2022-08-04 | Stop reason: HOSPADM

## 2022-08-03 RX ORDER — ACETAMINOPHEN 325 MG/1
650 TABLET ORAL EVERY 4 HOURS PRN
Status: DISCONTINUED | OUTPATIENT
Start: 2022-08-03 | End: 2022-08-04 | Stop reason: HOSPADM

## 2022-08-03 RX ADMIN — LEVOTHYROXINE SODIUM 137 MCG: 0.14 TABLET ORAL at 10:49

## 2022-08-03 RX ADMIN — AMLODIPINE BESYLATE 2.5 MG: 2.5 TABLET ORAL at 10:49

## 2022-08-03 RX ADMIN — ISOSORBIDE MONONITRATE 60 MG: 60 TABLET ORAL at 10:51

## 2022-08-03 RX ADMIN — SODIUM CHLORIDE 75 ML/HR: 9 INJECTION, SOLUTION INTRAVENOUS at 08:35

## 2022-08-03 RX ADMIN — ISOSORBIDE MONONITRATE 30 MG: 30 TABLET ORAL at 10:50

## 2022-08-03 RX ADMIN — DIPHENHYDRAMINE HYDROCHLORIDE 50 MG: 25 CAPSULE ORAL at 12:44

## 2022-08-03 RX ADMIN — CLOPIDOGREL 75 MG: 75 TABLET, FILM COATED ORAL at 10:49

## 2022-08-03 RX ADMIN — PERFLUTREN 2 ML: 6.52 INJECTION, SUSPENSION INTRAVENOUS at 18:45

## 2022-08-03 RX ADMIN — TAMSULOSIN HYDROCHLORIDE 0.4 MG: 0.4 CAPSULE ORAL at 23:16

## 2022-08-03 RX ADMIN — SODIUM CHLORIDE 75 ML/HR: 9 INJECTION, SOLUTION INTRAVENOUS at 16:52

## 2022-08-03 RX ADMIN — PREDNISONE 50 MG: 50 TABLET ORAL at 03:14

## 2022-08-03 RX ADMIN — PREDNISONE 50 MG: 50 TABLET ORAL at 12:44

## 2022-08-03 RX ADMIN — PANTOPRAZOLE SODIUM 40 MG: 40 TABLET, DELAYED RELEASE ORAL at 10:55

## 2022-08-03 RX ADMIN — METHYLPREDNISOLONE SODIUM SUCCINATE 40 MG: 40 INJECTION, POWDER, FOR SOLUTION INTRAMUSCULAR; INTRAVENOUS at 03:14

## 2022-08-03 RX ADMIN — LOSARTAN POTASSIUM 25 MG: 25 TABLET, FILM COATED ORAL at 10:49

## 2022-08-03 RX ADMIN — METHYLPREDNISOLONE SODIUM SUCCINATE 40 MG: 40 INJECTION, POWDER, FOR SOLUTION INTRAMUSCULAR; INTRAVENOUS at 00:49

## 2022-08-03 NOTE — H&P
[facility] H&P        Subjective:     Encounter Date:08/02/2022      Patient ID: Rishi Galicia is a 67 y.o. male.      Chief Complaint:    HPI:Pleasant 67-year-old male well-known to me.  He has known coronary status post previous coronary artery bypass surgery and multiple previous percutaneous coronary interventions.  He reports recurrent predictable left chest discomfort during exertion.  This is associated with increased dyspnea and occasional diaphoresis.  He feels that the symptoms have progressed in severity.  He therefore presented to [facility] emergency department for further evaluation and treatment.  He is free of orthopnea or PND no lower extremity edema.  He denies syncope near syncope or significant palpitations.  His cardiac risk factors include hypertension, hyperlipidemia, continued tobacco abuse at 1/2 pack/day.  His last coronary angiography 3/31/2022 revealed significant three-vessel coronary artery disease.  LIMA to the LAD is widely patent, right coronary artery was a chronic total occlusion not amenable to revascularization.  Saphenous vein graft to the marginal branch was patent but demonstrated an 80% stenosis.  He underwent percutaneous coronary intervention with deployment of a Xience drug-eluting stent 4.0 x 23 reducing the 80% stenosis to less than 10% residual narrowing.  He has been maintained on dual antiplatelet therapy he does have a known contrast allergy.  He has a history of chronic kidney disease.  Lab data from May 2, 2022 reveals BUN 14 creatinine 1.53 which is stable for him.  He is presently resting comfortably free of chest discomfort or respiratory insufficiency remains in sinus rhythm.Troponin x2 is less than 0.01.  EKG reviewed by me from 8/3/2022 reveals sinus rhythm, probable left atrial abnormality [place] significant ST-T wave changes of ischemia.      The following portions of the patient's history were reviewed and updated as  appropriate: allergies, current medications, past family history, past medical history, past social history, past surgical history and problem list.      Past Medical History:   Diagnosis Date   • CAD (coronary artery disease)    • Chronic kidney disease    • GERD (gastroesophageal reflux disease) 05/14/2019   • H/O echocardiogram 07/18/2018    LV size normal.  EF 60-65%.  Basal posterior wall hypokinesis.  AV is trileaflet.  Mild MR and TR.     • History of heart artery stent 03/31/2022    Xience stent of the SVG to the OM1   • Hyperlipidemia    • Hypertension    • Hypothyroidism    • Peripheral vascular disease (HCC)    • Renal disorder    • Tobacco abuse          Past Surgical History:   Procedure Laterality Date   • CARDIAC CATHETERIZATION  07/20/2018    Native 3 vessel disease.  Patent stent in the LCx.  native  LAD, non-dominant RCA has .  Patent LIMA to LAD, SVG-OM grafts.     • CARDIAC CATHETERIZATION N/A 11/03/2020    Procedure: Left Heart Cath;  Surgeon: Yunior Crook MD;  Location: [facility] CATH INVASIVE LOCATION;  Service: Cardiovascular;  Laterality: N/A;   • CARDIAC CATHETERIZATION N/A 11/03/2020    Procedure: Coronary angiography;  Surgeon: Yunior Crook MD;  [facility];  Service: Cardiovascular;  Laterality: N/A;   • CARDIAC CATHETERIZATION N/A 11/03/2020    Procedure: Left ventriculography;  Surgeon: Yunior Crook MD;  [facility];  Service: Cardiovascular;  Laterality: N/A;   • CARDIAC CATHETERIZATION N/A 11/03/2020    Procedure: Saphenous Vein Graft;  Surgeon: Yunior Crook MD;  Location: [facility] CATH INVASIVE LOCATION;  Service: Cardiovascular;  Laterality: N/A;   • CARDIAC CATHETERIZATION N/A 11/03/2020    Procedure: Percutaneous Coronary Intervention;  Surgeon: Yunior Crook MD;  [facility];  Service: Cardiovascular;  Laterality: N/A;   • CARDIAC CATHETERIZATION N/A 05/04/2021    Procedure:  Left Heart Cath;  Surgeon: Andrew Ocampo MD;  [facility];  Service: Cardiology;  Laterality: N/A;   • CARDIAC CATHETERIZATION N/A 05/04/2021    Procedure: Coronary angiography;  Surgeon: Andrew Ocampo MD;  Location:  [facility] CATH INVASIVE LOCATION;  Service: Cardiology;  Laterality: N/A;   • CARDIAC CATHETERIZATION N/A 05/04/2021    Procedure: Left ventriculography;  Surgeon: Andrew Ocampo MD;  [facility];  Service: Cardiology;  Laterality: N/A;   • CARDIAC CATHETERIZATION  05/04/2021    Procedure: Saphenous Vein Graft;  Surgeon: Andrew Ocampo MD;  [facility];  Service: Cardiology;;   • CARDIAC CATHETERIZATION N/A 03/31/2022    Procedure: Left Heart Cath;  Surgeon: Andrew Ocampo MD;  [facility];  Service: Cardiology;  Laterality: N/A;   • CARDIAC CATHETERIZATION N/A 03/31/2022    Procedure: Left ventriculography;  Surgeon: Andrew Ocampo MD;  [facility];  Service: Cardiology;  Laterality: N/A;   • CARDIAC CATHETERIZATION N/A 03/31/2022    Procedure: Coronary angiography;  Surgeon: Andrew Ocampo MD;  [facility];  Service: Cardiology;  Laterality: N/A;   • CARDIAC CATHETERIZATION  03/31/2022    Procedure: Saphenous Vein Graft;  Surgeon: Andrew Ocampo MD;  Location: [facility] CATH INVASIVE LOCATION;  Service: Cardiology;;   • CARDIAC CATHETERIZATION N/A 03/31/2022    Procedure: Stent JAMES bypass graft;  Surgeon: Andrew Ocampo MD;  Location: [facility] CATH INVASIVE LOCATION;  Service: Cardiology;  Laterality: N/A;   • CARDIAC SURGERY     • CORONARY ANGIOPLASTY WITH STENT PLACEMENT     • CORONARY ANGIOPLASTY WITH STENT PLACEMENT      4/25/16 - SYNERGY stent to the distal left PDA  and distal and mid LIMA to LAD.  .  12/2/15 -  PROMUS stent ot  the first obtuse marginal branch via SVG.     • CORONARY ARTERY BYPASS GRAFT  2009   • CORONARY STENT PLACEMENT Left 03/31/2022    Xience stent of the SVG to the OM1   • ILIAC ARTERY STENT  2016    S/P right common iliac artery stent placement and balloon angioplasty with no significant residual stenosis and balloon angioplasty of the left tibioperoneal trunk with no significant residual stenosis.  Performed b Dr. Carlo Grijalva.   • KNEE CARTILAGE SURGERY Right      Family history: Negative for premature coronary heart disease    Social History     Socioeconomic History   • Marital status: Single   Tobacco Use   • Smoking status: Current Every Day Smoker     Packs/day: 0.50     Years: 40.00     Pack years: 20.00     Types: Cigarettes   • Smokeless tobacco: Never Used   Substance and Sexual Activity   • Alcohol use: No   • Drug use: No   • Sexual activity: Defer         Allergies   Allergen Reactions   • Contrast Dye Hives and Itching     swell   • Latex Hives and Itching   • Statins Provider Review Needed   • Ranolazine Other (See Comments) and Provider Review Needed     Other reaction(s): Chest pain, Unknown         Current Medications:   Scheduled Meds:diphenhydrAMINE, 50 mg, Oral, Once  diphenhydrAMINE, 50 mg, Intravenous, Once  predniSONE, 50 mg, Oral, Q6H      Continuous Infusions:sodium chloride, 75 mL/hr, Last Rate: 75 mL/hr (08/03/22 0835)        Review of Systems   Constitutional: Negative for malaise/fatigue.   HENT: Negative for hearing loss and nosebleeds.    Eyes: Negative for double vision and visual disturbance.   Cardiovascular: Positive for chest pain. Negative for claudication, dyspnea on exertion, near-syncope, orthopnea, palpitations, paroxysmal nocturnal dyspnea and syncope.   Respiratory: Positive for shortness of breath. Negative for cough, sleep disturbances due to breathing, snoring, sputum production and wheezing.    Endocrine: Negative for cold intolerance, heat intolerance, polydipsia and  "polyuria.   Hematologic/Lymphatic: Negative for adenopathy and bleeding problem. Does not bruise/bleed easily.   Skin: Negative for flushing and itching.   Musculoskeletal: Negative for back pain, falls, joint pain, joint swelling, muscle weakness and neck pain.   Gastrointestinal: Negative for abdominal pain, dysphagia, heartburn, nausea and vomiting.   Genitourinary: Negative for dysuria and frequency.   Neurological: Negative for disturbances in coordination, dizziness, light-headedness, loss of balance, numbness and weakness.   Psychiatric/Behavioral: Negative for altered mental status and memory loss. The patient is not nervous/anxious.    Allergic/Immunologic: Negative for hives and persistent infections.          Objective:         /80 (BP Location: Left arm, Patient Position: Lying)   Pulse 80   Temp 98.3 °F (36.8 °C) (Oral)   Resp 16   Ht 172.7 cm (68\")   Wt 83 kg (183 lb)   SpO2 93%   BMI 27.83 kg/m²     Constitutional:       Appearance: Well-developed.   Eyes:      Conjunctiva/sclera: Conjunctivae normal.      Pupils: Pupils are equal, round, and reactive to light.   HENT:      Head: Normocephalic and atraumatic.   Neck:      Thyroid: No thyromegaly.   Pulmonary:      Effort: Pulmonary effort is normal. No respiratory distress.      Breath sounds: Normal breath sounds. No wheezing. No rales.   Cardiovascular:      Normal rate. Regular rhythm.      S4 Gallop. No S3 gallop. No rub.   Edema:     Peripheral edema absent.   Abdominal:      General: Bowel sounds are normal. There is no distension.      Palpations: Abdomen is soft. There is no abdominal mass.      Tenderness: There is no abdominal tenderness.   Musculoskeletal: Normal range of motion.      Cervical back: Neck supple. Skin:     General: Skin is warm and dry.      Findings: No erythema.   Neurological:      Mental Status: Alert and oriented to person, place, and time.               ASCVD RIsk Score::  The ASCVD Risk score (Maximilian ALANIZ " Jr. et al., 2013) failed to calculate for the following reasons:    Cannot find a previous HDL lab    Cannot find a previous total cholesterol lab      Lab Review:   not applicable     Results from last 7 days   Lab Units 08/02/22  1328   SODIUM mmol/L 138   POTASSIUM mmol/L 4.1   CHLORIDE mmol/L 107   CO2 mmol/L 23.1   BUN mg/dL 14   CREATININE mg/dL 1.53*   GLUCOSE mg/dL 99   CALCIUM mg/dL 9.1   AST (SGOT) U/L 13   ALT (SGPT) U/L 21     Results from last 7 days   Lab Units 08/02/22  1519 08/02/22  1328   TROPONIN T ng/mL <0.010 <0.010     Results from last 7 days   Lab Units 08/02/22  1328   WBC 10*3/mm3 6.81   HEMOGLOBIN g/dL 14.3   HEMATOCRIT % 41.0   PLATELETS 10*3/mm3 148     Results from last 7 days   Lab Units 08/02/22  1328   INR  1.17*               Invalid input(s): LDLCALC            Recent Radiology:  Imaging Results (Most Recent)     Procedure Component Value Units Date/Time    XR Chest 2 View [[id number]] Collected: 08/02/22 1345     Updated: 08/02/22 1350    Narrative:      XR CHEST 2 VW-  08/02/2022     HISTORY: Chest pain.     Heart size is within normal limits. Lungs appear clear. Sternotomy wires  are seen. There are coronary calcification or faintly visualized  coronary stent.     Bones and soft tissues are unremarkable.       Impression:      1. No acute process.     This report was finalized on 8/2/2022 1:47 PM by Dr. Sujit Ortega M.D.             EKG:        Assessment:         Active Hospital Problems    Diagnosis  POA   • **Precordial chest pain [R07.2]  Yes   • Stage 3a chronic kidney disease (HCC) [N18.31]  Unknown   • Allergy to contrast media (used for diagnostic x-rays) [Z91.041]  Unknown   • Grade II diastolic dysfunction [I51.89]  Yes   • History of coronary artery bypass graft [Z95.1]  Not Applicable   • Essential hypertension [I10]  Yes   • Tobacco abuse [Z72.0]  Yes   • Hyperlipidemia LDL goal <70 [E78.5]  Yes     Recurrent predictable exertional chest discomfort with  shortness of breath and diaphoresis consistent with class III angina     Plan:       Rishi reports symptoms consistent with class III angina.,  Progressive pattern and predictable with exertion.  His risk factors include hypertension hyperlipidemia and tobacco abuse.  He has known underlying coronary disease status post bypass surgery and multiple previous percutaneous coronary intervention.  Based on the above evaluation proceed with coronary angiography.  Risk benefits been explained patient understands and is prepared to proceed.  He has been pretreated for contrast allergy.  We will reassess his renal function and initiate hydration.  I discussed his case with Dr. Yunior Durham, interventionalists.  We have reemphasized the importance of risk modification specifically discontinuing tobacco abuse, observing a low-cholesterol low saturated fat diet and remaining aerobically active.             Yunior Crook MD  08/03/22  09:46 EDT

## 2022-08-03 NOTE — PLAN OF CARE
Goal Outcome Evaluation:  Plan of Care Reviewed With: patient        Progress: improving  Outcome Evaluation: No major issues over shift, VS stable, pt alert and oriented, only complaining of minimal chest pain, pt underwent a cardiac cath this afternoon, no intervention necessary, pt back in room, pt now going for an echocardiogram, pt on IV fluids, no new complaints, will continue to monitor

## 2022-08-03 NOTE — DISCHARGE INSTRUCTIONS
[facility]  [address] [place] [address]    Coronary Angiogram (Radial/Ulnar Approach) After Care    Refer to this sheet in the next few weeks. These instructions provide you with information on caring for yourself after your procedure. Your caregiver may also give you more specific instructions. Your treatment has been planned according to current medical practices, but problems sometimes occur. Call your caregiver if you have any problems or questions after your procedure.    Home Care Instructions:  You may shower the day after the procedure. Remove the bandage (dressing) and gently wash the site with plain soap and water. Gently pat the site dry. You may apply a band aid daily for 2 days if desired.    Do not apply powder or lotion to the site.  Do not submerge the affected site in water for 3 to 5 days or until the site is completely healed.   Do not lift, push or pull anything over 5 pounds for 5 days after your procedure or as directed by your physician.  As a reference, a gallon of milk weighs 8 pounds.   Inspect the site at least twice daily. You may notice some bruising at the site and it may be tender for 1 to 2 weeks.     Increase your fluid intake for the next 2 days.    Keep arm elevated for 24 hours. For the remainder of the day, keep your arm in “Pledge of Allegiance” position when up and about.     You may drive 24 hours after the procedure unless otherwise instructed by your caregiver.  Do not operate machinery or power tools for 24 hours.  A responsible adult should be with you for the first 24 hours after you arrive home. Do not make any important legal decisions or sign legal papers for 24 hours.  Do not drink alcohol for 24 hours.    Metformin or any medications containing Metformin should not be taken for 48 hours after your procedure.      Call Your Doctor if:   You have unusual pain at the radial/ulnar (wrist) site.  You have redness, warmth, swelling, or pain at the  radial/ulnar (wrist) site.  You have drainage (other than a small amount of blood on the dressing).  `You have chills or a fever > 101.  Your arm becomes pale or dark, cool, tingly, or numb.  You develop chest pain, shortness of breath, feel faint or pass out.    You have heavy bleeding from the site, hold pressure on the site for 20 minutes.  If the bleeding stops, apply a fresh bandage and call your cardiologist.  However, if you        continue to have bleeding, call 911 and continue to apply pressure to the site.   You have any symptoms of a stroke.  Remember BE FAST  B-balance. Sudden trouble walking or loss of balance.  E-eyes.  Sudden changes in how you see or a sudden onset of a very bad headache.   F-face. Sudden weakness or loss of feeling of the face or facial droop on one side.   A-arms Sudden weakness or numbness in one arm.  One arm drifts down if they are both held out in front of you. This happens suddenly and usually on one side of the body.   S-speech.  Sudden trouble speaking, slurred speech or trouble understanding what are saying.   T-time  Time to call emergency services.  Write down the symptoms and the time they started.

## 2022-08-03 NOTE — PLAN OF CARE
Problem: Adult Inpatient Plan of Care  Goal: Plan of Care Review  Outcome: Ongoing, Progressing  Flowsheets (Taken 8/3/2022 6355)  Progress: no change  Plan of Care Reviewed With: patient  Outcome Evaluation: VSS. Up at [facility]. Pt NPO since MN- probable heart cath today. Contrast dye protocol given per MAR for allergy to contrast dye. Pt stable and needs met at this time.    Hien Crook aware of 3.0 and 3.2 sec pauses- pt asymptomatic. No new orders received at this time.

## 2022-08-04 VITALS
HEIGHT: 68 IN | OXYGEN SATURATION: 94 % | TEMPERATURE: 97.8 F | DIASTOLIC BLOOD PRESSURE: 71 MMHG | HEART RATE: 59 BPM | RESPIRATION RATE: 18 BRPM | BODY MASS INDEX: 27.74 KG/M2 | WEIGHT: 183 LBS | SYSTOLIC BLOOD PRESSURE: 145 MMHG

## 2022-08-04 LAB
AORTIC DIMENSIONLESS INDEX: 1 (DI)
BH CV ECHO MEAS - ACS: 2 CM
BH CV ECHO MEAS - AO MAX PG: 8.6 MMHG
BH CV ECHO MEAS - AO MEAN PG: 4.5 MMHG
BH CV ECHO MEAS - AO ROOT DIAM: 3.3 CM
BH CV ECHO MEAS - AO V2 MAX: 146.6 CM/SEC
BH CV ECHO MEAS - AO V2 VTI: 23.2 CM
BH CV ECHO MEAS - AVA(I,D): 3.7 CM2
BH CV ECHO MEAS - EDV(CUBED): 56.5 ML
BH CV ECHO MEAS - EDV(MOD-SP2): 66 ML
BH CV ECHO MEAS - EDV(MOD-SP4): 68 ML
BH CV ECHO MEAS - EF(MOD-BP): 77.9 %
BH CV ECHO MEAS - EF(MOD-SP2): 80.3 %
BH CV ECHO MEAS - EF(MOD-SP4): 76.5 %
BH CV ECHO MEAS - ESV(CUBED): 10.7 ML
BH CV ECHO MEAS - ESV(MOD-SP2): 13 ML
BH CV ECHO MEAS - ESV(MOD-SP4): 16 ML
BH CV ECHO MEAS - FS: 42.5 %
BH CV ECHO MEAS - IVS/LVPW: 0.91 CM
BH CV ECHO MEAS - IVSD: 1.09 CM
BH CV ECHO MEAS - LAT PEAK E' VEL: 9.2 CM/SEC
BH CV ECHO MEAS - LV DIASTOLIC VOL/BSA (35-75): 34.5 CM2
BH CV ECHO MEAS - LV MASS(C)D: 145 GRAMS
BH CV ECHO MEAS - LV MAX PG: 5.2 MMHG
BH CV ECHO MEAS - LV MEAN PG: 3 MMHG
BH CV ECHO MEAS - LV SYSTOLIC VOL/BSA (12-30): 8.1 CM2
BH CV ECHO MEAS - LV V1 MAX: 114 CM/SEC
BH CV ECHO MEAS - LV V1 VTI: 24.4 CM
BH CV ECHO MEAS - LVIDD: 3.8 CM
BH CV ECHO MEAS - LVIDS: 2.21 CM
BH CV ECHO MEAS - LVOT AREA: 3.5 CM2
BH CV ECHO MEAS - LVOT DIAM: 2.11 CM
BH CV ECHO MEAS - LVPWD: 1.2 CM
BH CV ECHO MEAS - MED PEAK E' VEL: 4.7 CM/SEC
BH CV ECHO MEAS - MV A MAX VEL: 99.5 CM/SEC
BH CV ECHO MEAS - MV DEC SLOPE: 310.9 CM/SEC2
BH CV ECHO MEAS - MV DEC TIME: 0.24 MSEC
BH CV ECHO MEAS - MV E MAX VEL: 67.8 CM/SEC
BH CV ECHO MEAS - MV E/A: 0.68
BH CV ECHO MEAS - MV MAX PG: 5.3 MMHG
BH CV ECHO MEAS - MV MEAN PG: 1.61 MMHG
BH CV ECHO MEAS - MV P1/2T: 70.3 MSEC
BH CV ECHO MEAS - MV V2 VTI: 22.1 CM
BH CV ECHO MEAS - MVA(P1/2T): 3.1 CM2
BH CV ECHO MEAS - MVA(VTI): 3.9 CM2
BH CV ECHO MEAS - PA V2 MAX: 118.1 CM/SEC
BH CV ECHO MEAS - QP/QS: 0.58
BH CV ECHO MEAS - RAP SYSTOLE: 8 MMHG
BH CV ECHO MEAS - RV MAX PG: 2.12 MMHG
BH CV ECHO MEAS - RV V1 MAX: 72.8 CM/SEC
BH CV ECHO MEAS - RV V1 VTI: 12 CM
BH CV ECHO MEAS - RVOT DIAM: 2.29 CM
BH CV ECHO MEAS - SI(MOD-SP2): 26.9 ML/M2
BH CV ECHO MEAS - SI(MOD-SP4): 26.4 ML/M2
BH CV ECHO MEAS - SV(LVOT): 85.3 ML
BH CV ECHO MEAS - SV(MOD-SP2): 53 ML
BH CV ECHO MEAS - SV(MOD-SP4): 52 ML
BH CV ECHO MEAS - SV(RVOT): 49.4 ML
BH CV ECHO MEASUREMENTS AVERAGE E/E' RATIO: 9.76
BH CV XLRA - TDI S': 9.8 CM/SEC
LEFT ATRIUM VOLUME INDEX: 22.4 ML/M2
MAXIMAL PREDICTED HEART RATE: 152 BPM
SINUS: 3 CM
STRESS TARGET HR: 129 BPM

## 2022-08-04 PROCEDURE — 99217 PR OBSERVATION CARE DISCHARGE MANAGEMENT: CPT | Performed by: INTERNAL MEDICINE

## 2022-08-04 PROCEDURE — G0378 HOSPITAL OBSERVATION PER HR: HCPCS

## 2022-08-04 RX ORDER — ISOSORBIDE MONONITRATE 60 MG/1
120 TABLET, EXTENDED RELEASE ORAL
Status: DISCONTINUED | OUTPATIENT
Start: 2022-08-04 | End: 2022-08-04 | Stop reason: HOSPADM

## 2022-08-04 RX ORDER — METOPROLOL SUCCINATE 25 MG/1
25 TABLET, EXTENDED RELEASE ORAL DAILY
Qty: 30 TABLET | Refills: 11 | Status: SHIPPED | OUTPATIENT
Start: 2022-08-04 | End: 2022-08-19 | Stop reason: SINTOL

## 2022-08-04 RX ORDER — AMLODIPINE BESYLATE 5 MG/1
5 TABLET ORAL DAILY
Status: DISCONTINUED | OUTPATIENT
Start: 2022-08-04 | End: 2022-08-04 | Stop reason: HOSPADM

## 2022-08-04 RX ADMIN — SODIUM CHLORIDE 75 ML/HR: 9 INJECTION, SOLUTION INTRAVENOUS at 06:00

## 2022-08-04 RX ADMIN — LOSARTAN POTASSIUM 25 MG: 25 TABLET, FILM COATED ORAL at 08:36

## 2022-08-04 RX ADMIN — CLOPIDOGREL 75 MG: 75 TABLET, FILM COATED ORAL at 08:36

## 2022-08-04 RX ADMIN — PANTOPRAZOLE SODIUM 40 MG: 40 TABLET, DELAYED RELEASE ORAL at 08:36

## 2022-08-04 RX ADMIN — LEVOTHYROXINE SODIUM 137 MCG: 0.14 TABLET ORAL at 06:00

## 2022-08-04 NOTE — PLAN OF CARE
Goal Outcome Evaluation:  Plan of Care Reviewed With: patient           Outcome Evaluation: No acute issues tonight. Slept well. IV fluids infusing. No c/o chest pain or change in breathing. Will continue to monitor.

## 2022-08-04 NOTE — PLAN OF CARE
Goal Outcome Evaluation:  Plan of Care Reviewed With: patient     Pt meets criteria for discharge, being discharged home. Received discharge instructions. Reviewed with pt. IV and monitor removed. Pt to follow up with Dr. Crook.

## 2022-08-04 NOTE — PROGRESS NOTES
Discharge Planning Assessment  [facility]     Patient Name: Rishi Galicia  MRN: [id number]  Today's Date: 8/4/2022    Admit Date: 8/2/2022     Discharge Needs Assessment    No documentation.                Discharge Plan     Row Name 08/04/22 0849       Plan    Plan Home    Final Discharge Disposition Code 01 - home or self-care    Final Note HOme              Continued Care and Services - Admitted Since 8/2/2022    Coordination has not been started for this encounter.       Expected Discharge Date and Time     Expected Discharge Date Expected Discharge Time    Aug 4, 2022          Demographic Summary    No documentation.                Functional Status    No documentation.                Psychosocial    No documentation.                Abuse/Neglect    No documentation.                Legal    No documentation.                Substance Abuse    No documentation.                Patient Forms    No documentation.                   Chyna Smith RN

## 2022-08-04 NOTE — DISCHARGE SUMMARY
[facility]    DISCHARGE SUMMARY      Patient Name: Rishi Galicia  :1955  67 y.o.    Date of Admit: 2022  Date of Discharge:  2022    Discharge Diagnosis:  Problems Addressed this Visit        Cardiac and Vasculature    * (Principal) Precordial chest pain - Primary      Other Visit Diagnoses     Coronary artery disease involving native heart with unstable angina pectoris, unspecified vessel or lesion type (HCC)        Relevant Medications    metoprolol succinate XL (TOPROL-XL) 25 MG 24 hr tablet    Renal insufficiency        Allergy to intravenous contrast          Diagnoses       Codes Comments    Precordial chest pain    -  Primary ICD-10-CM: R07.2  ICD-9-CM: 786.51     Coronary artery disease involving native heart with unstable angina pectoris, unspecified vessel or lesion type (HCC)     ICD-10-CM: I25.110  ICD-9-CM: 414.01, 413.9     Renal insufficiency     ICD-10-CM: N28.9  ICD-9-CM: 593.9     Allergy to intravenous contrast     ICD-10-CM: Z91.041  ICD-9-CM: V15.08           Hospital Course: Patient has a history of CAD with remote CABG who had a JAMES to the SVG-OM in 3/2022.  He presented with increased angina and was admitted. Troponins were normal and EKG was without acute changes.  He had a cath which showed:     CORONARY ANGIOGRAPHY:  LEFT MAIN: Short.  Bifurcates to the LAD and circumflex.  Appears to be stented with mild in-stent restenosis about 30% at the distal portion.  LAD: Occluded at the ostium  Ramus: Absent  Circumflex: Large-caliber, dominant circumflex.  Previously placed proximal and mid and distal vessel stents are widely patent.  Distal circumflex gives rise to medium caliber left PDA which has mild diffuse disease  RCA: Known to be chronically occluded  Vein graft to OM1/OM 2 has a previously placed stent which is widely patent.  Touches down the OM1.  LIMA to LAD is widely patent.  Touches down the mid LAD.  Distal to the anastomosis there is a 50 to  60% stenosis which appears unchanged from prior angiography.  Distal to that the mid to distal LAD is stented with mild in-stent diffuse restenosis.    Conclusion       · Severe multivessel coronary artery disease. Occluded ostial LAD, OM1 and OM 2, RCA  · LIMA to LAD is widely patent. Distal to the anastomosis there is a moderate 50% lesion which appears unchanged from previous imaging  · SVG to OM1/OM 2 is widely patent previously placed proximal stent  · Normal LV filling pressures.  · Recommendations: Uptitrate antianginal therapy      Uptitration of antianginals recommended.  He was placed back on metoprolol and will be discharged to followup with Dr. Crook           Procedures Performed  Procedure(s):  Left Heart Cath  Coronary angiography  Saphenous Vein Graft  Native mammary injection       Consults     Date and Time Order Name Status Description    8/2/2022  2:29 PM GABRIEL (on-call MD unless specified)            Pertinent Test Results:   Results from last 7 days   Lab Units 08/03/22  0913 08/02/22  1328   SODIUM mmol/L 141 138   POTASSIUM mmol/L 4.2 4.1   CHLORIDE mmol/L 107 107   CO2 mmol/L 19.5* 23.1   BUN mg/dL 14 14   CREATININE mg/dL 1.36* 1.53*   CALCIUM mg/dL 9.3 9.1   BILIRUBIN mg/dL  --  0.2   ALK PHOS U/L  --  63   ALT (SGPT) U/L  --  21   AST (SGOT) U/L  --  13   GLUCOSE mg/dL 130* 99     Results from last 7 days   Lab Units 08/03/22  0913 08/02/22  1519 08/02/22  1328   TROPONIN T ng/mL <0.010 <0.010 <0.010     @LABRCNT(bnp)@  Results from last 7 days   Lab Units 08/02/22  1328   WBC 10*3/mm3 6.81   HEMOGLOBIN g/dL 14.3   HEMATOCRIT % 41.0   PLATELETS 10*3/mm3 148     Results from last 7 days   Lab Units 08/02/22  1328   INR  1.17*               Condition on Discharge: stable    Discharge Medications     Discharge Medications      New Medications      Instructions Start Date   metoprolol succinate XL 25 MG 24 hr tablet  Commonly known as: TOPROL-XL   25 mg, Oral, Daily         Changes to  Medications      Instructions Start Date   acetaminophen 325 MG tablet  Commonly known as: TYLENOL  What changed: when to take this   650 mg, Oral, Every 4 Hours PRN      nitroglycerin 0.4 MG SL tablet  Commonly known as: NITROSTAT  What changed:   · how much to take  · how to take this  · when to take this  · reasons to take this  · additional instructions   1 under the tongue as needed for angina, may repeat q5mins for up three doses         Continue These Medications      Instructions Start Date   alfuzosin 10 MG 24 hr tablet  Commonly known as: UROXATRAL   10 mg, Oral, Nightly      amLODIPine 2.5 MG tablet  Commonly known as: NORVASC   2.5 mg, Oral, Daily      clopidogrel 75 MG tablet  Commonly known as: PLAVIX   75 mg, Oral, Daily      Evolocumab solution auto-injector SureClick injection  Commonly known as: REPATHA   1 syringe, Subcutaneous, Every 14 Days      ezetimibe 10 MG tablet  Commonly known as: ZETIA   10 mg, Oral, Daily      isosorbide mononitrate 60 MG 24 hr tablet  Commonly known as: IMDUR   60 mg, Oral, Daily, Take one 60mg tab and one 30mg tab together daily      isosorbide mononitrate 30 MG 24 hr tablet  Commonly known as: IMDUR   30 mg, Oral, Daily, Take one 60mg tab and one 30mg tab together daily      levothyroxine 137 MCG tablet  Commonly known as: SYNTHROID, LEVOTHROID   1 tablet, Oral, See Admin Instructions, Takes 6 days a week: Monday, Tuesday, Wednesday, Thursday, Friday, and Saturday      losartan 50 MG tablet  Commonly known as: COZAAR   25 mg, Oral, Daily      pantoprazole 40 MG EC tablet  Commonly known as: PROTONIX   40 mg, Oral, Daily             Discharge Diet:     Activity at Discharge:     Discharge disposition: home    Follow-up Appointments  No future appointments.      Test Results Pending at Discharge       Helio Alejo MD, [facility]    08/04/22  07:24 EDT    Time: >30 minutes

## 2022-08-10 DIAGNOSIS — K21.9 GASTROESOPHAGEAL REFLUX DISEASE WITHOUT ESOPHAGITIS: ICD-10-CM

## 2022-08-10 DIAGNOSIS — I25.83 CORONARY ARTERY DISEASE DUE TO LIPID RICH PLAQUE: Primary | ICD-10-CM

## 2022-08-10 DIAGNOSIS — I10 ESSENTIAL HYPERTENSION: ICD-10-CM

## 2022-08-10 DIAGNOSIS — I25.10 CORONARY ARTERY DISEASE DUE TO LIPID RICH PLAQUE: Primary | ICD-10-CM

## 2022-08-10 RX ORDER — PANTOPRAZOLE SODIUM 40 MG/1
TABLET, DELAYED RELEASE ORAL
Qty: 90 TABLET | Refills: 3 | Status: SHIPPED | OUTPATIENT
Start: 2022-08-10

## 2022-08-10 RX ORDER — CLOPIDOGREL BISULFATE 75 MG/1
TABLET ORAL
Qty: 90 TABLET | Refills: 3 | Status: SHIPPED | OUTPATIENT
Start: 2022-08-10

## 2022-08-10 RX ORDER — ISOSORBIDE MONONITRATE 60 MG/1
TABLET, EXTENDED RELEASE ORAL
Qty: 90 TABLET | Refills: 3 | Status: SHIPPED | OUTPATIENT
Start: 2022-08-10 | End: 2022-09-22

## 2022-08-10 RX ORDER — ISOSORBIDE MONONITRATE 30 MG/1
TABLET, EXTENDED RELEASE ORAL
Qty: 90 TABLET | Refills: 3 | Status: SHIPPED | OUTPATIENT
Start: 2022-08-10 | End: 2022-09-22

## 2022-08-10 RX ORDER — AMLODIPINE BESYLATE 2.5 MG/1
TABLET ORAL
Qty: 90 TABLET | Refills: 3 | Status: SHIPPED | OUTPATIENT
Start: 2022-08-10

## 2022-08-10 RX ORDER — LOSARTAN POTASSIUM 50 MG/1
TABLET ORAL
Qty: 45 TABLET | Refills: 3 | Status: SHIPPED | OUTPATIENT
Start: 2022-08-10

## 2022-08-18 NOTE — PROGRESS NOTES
"Chief Complaint  Coronary Artery Disease    Subjective    History of Present Illness      I saw Rishi Galicia today for cardiovascular care.  He is a pleasant 67-year-old male with history of coronary heart disease.  He has undergone previous coronary artery bypass surgery and percutaneous coronary interventions.  He underwent coronary angiography 8/3/2022 and continued medical therapy was recommended.  He denies current chest pain pressure or tightness.  His respiratory status is stable.  He denies orthopnea or PND no lower extremity edema.  He is free of syncope near syncope or significant palpitations.  His primary complaint is that of a sinus type headache after he takes his morning medications.  He is unclear as to which one it might be.  He continues to smoke 1/2 pack of cigarettes daily.Echocardiogram 8/3/2022 reveals preserved left ventricular function, grade 1 diastolic dysfunction, trace aortic regurgitation.    Objective   Vital Signs:   /72   Pulse 82   Ht 172.7 cm (68\")   Wt 84.8 kg (187 lb)   BMI 28.43 kg/m²     Constitutional:       Appearance: Well-developed.   Eyes:      Conjunctiva/sclera: Conjunctivae normal.      Pupils: Pupils are equal, round, and reactive to light.   HENT:      Head: Normocephalic and atraumatic.   Neck:      Thyroid: No thyromegaly.   Pulmonary:      Effort: Pulmonary effort is normal. No respiratory distress.      Breath sounds: Normal breath sounds. No wheezing. No rales.   Cardiovascular:      Normal rate. Regular rhythm.      S4 Gallop. No S3 gallop. No rub.   Edema:     Peripheral edema absent.   Abdominal:      General: Bowel sounds are normal. There is no distension.      Palpations: Abdomen is soft. There is no abdominal mass.      Tenderness: There is no abdominal tenderness.   Musculoskeletal: Normal range of motion.      Cervical back: Neck supple. Skin:     General: Skin is warm and dry.      Findings: No erythema.   Neurological:      Mental Status: " Alert and oriented to person, place, and time.         Result Review :     Common labs    Common Labsle 4/1/22 8/2/22 8/2/22 8/3/22     1328 1328    Glucose 115 (A)  99 130 (A)   BUN 30 (A)  14 14   Creatinine 1.58 (A)  1.53 (A) 1.36 (A)   Sodium 137  138 141   Potassium 3.9  4.1 4.2   Chloride 106  107 107   Calcium 8.9  9.1 9.3   Albumin   4.20    Total Bilirubin   0.2    Alkaline Phosphatase   63    AST (SGOT)   13    ALT (SGPT)   21    WBC  6.81     Hemoglobin  14.3     Hematocrit  41.0     Platelets  148     (A) Abnormal value                      Assessment and Plan    1. Coronary artery disease due to lipid rich plaque  Stable    2. History of coronary artery bypass graft  Stable    3. Essential hypertension  Controlled    4. Grade II diastolic dysfunction  Compensated    5. Hyperlipidemia LDL goal <70  Statin intolerant, continue Zetia 10 mg daily and PCSK9 inhibitor    6.  Tobacco abuse  Counseled to discontinue    Rishi is free of angina and appears euvolemic on examination.  I have encouraged him to observe a low-cholesterol low saturated fat diet and discontinue tobacco use he will discontinue individually each of his morning medications to try to identify which medicine may be causing his sinus type headache.  He will communicate back over the next 7 to 10 days.  I will see him in follow-up in 4 weeks sooner if needed.            Follow Up   No follow-ups on file.  Patient was given instructions and counseling regarding his condition or for health maintenance advice. Please see specific information pulled into the AVS if appropriate.

## 2022-08-19 ENCOUNTER — OFFICE VISIT (OUTPATIENT)
Dept: CARDIOLOGY | Facility: CLINIC | Age: 67
End: 2022-08-19

## 2022-08-19 VITALS
DIASTOLIC BLOOD PRESSURE: 72 MMHG | WEIGHT: 187 LBS | SYSTOLIC BLOOD PRESSURE: 112 MMHG | HEIGHT: 68 IN | BODY MASS INDEX: 28.34 KG/M2 | HEART RATE: 82 BPM

## 2022-08-19 DIAGNOSIS — E78.5 HYPERLIPIDEMIA LDL GOAL <70: Chronic | ICD-10-CM

## 2022-08-19 DIAGNOSIS — I10 ESSENTIAL HYPERTENSION: Chronic | ICD-10-CM

## 2022-08-19 DIAGNOSIS — I51.89 GRADE II DIASTOLIC DYSFUNCTION: Chronic | ICD-10-CM

## 2022-08-19 DIAGNOSIS — I25.83 CORONARY ARTERY DISEASE DUE TO LIPID RICH PLAQUE: Primary | Chronic | ICD-10-CM

## 2022-08-19 DIAGNOSIS — Z95.1 HISTORY OF CORONARY ARTERY BYPASS GRAFT: Chronic | ICD-10-CM

## 2022-08-19 DIAGNOSIS — I25.10 CORONARY ARTERY DISEASE DUE TO LIPID RICH PLAQUE: Primary | Chronic | ICD-10-CM

## 2022-08-19 PROCEDURE — 99214 OFFICE O/P EST MOD 30 MIN: CPT | Performed by: INTERNAL MEDICINE

## 2022-09-07 ENCOUNTER — TELEPHONE (OUTPATIENT)
Dept: CARDIOLOGY | Facility: CLINIC | Age: 67
End: 2022-09-07

## 2022-09-07 NOTE — TELEPHONE ENCOUNTER
PT saw CNA on 8/19/22 and they discussed his medication and he was to alternate the medication Amlodipine and Isosorbide. These are the ones that are causing his headaches and dizziness.

## 2022-09-07 NOTE — TELEPHONE ENCOUNTER
I spoke with the patient, he stopped each for a week & continued the other medication & vice versa. His headaches & dizziness has not subsided. His bp is holding steady at 117/70. Do you want to stop them both?

## 2022-09-12 NOTE — TELEPHONE ENCOUNTER
Yes for the time being he should stay off of both amlodipine and isosorbide.  If headaches persist he probably needs to see primary care and/or neurology.  We can refer to neurology if he would like.

## 2022-09-12 NOTE — TELEPHONE ENCOUNTER
Spoke to the patient, his bp went up today to 147/80, so he took his Amlodipine, advised to stay off the Isosorbide & will evaluate when he is seen on the [address].

## 2022-09-20 NOTE — PROGRESS NOTES
"Chief Complaint  No chief complaint on file.    Subjective    History of Present Illness      I saw Rishi Galicia today for cardiovascular care.  He is a pleasant 67-year-old male with known coronary heart disease status post previous revascularization with bypass surgery as well as percutaneously.  He underwent coronary angiography 8/3/2022 without any evidence of significant obstructive coronary disease.  He remains free of chest pain pressure tightness.  His respiratory status is stable.  He is does not report orthopnea or PND no lower extremity edema.  He does report some recurrent headaches and dizziness which he associates with his isosorbide mononitrate.  He was previously on 90 mg daily reduce it to 60 and down to 30 mg with continued symptoms.  His dizziness and headache resolved once he discontinued isosorbide.  He is free of syncope near syncope or palpitations.  He continues to smoke and 1/2 pack/day.    Objective   Vital Signs:   /82   Pulse 76   Ht 172.7 cm (68\")   Wt 85.3 kg (188 lb)   BMI 28.59 kg/m²     Constitutional:       Appearance: Well-developed.   Eyes:      Conjunctiva/sclera: Conjunctivae normal.      Pupils: Pupils are equal, round, and reactive to light.   HENT:      Head: Normocephalic and atraumatic.   Neck:      Thyroid: No thyromegaly.   Pulmonary:      Effort: Pulmonary effort is normal. No respiratory distress.      Breath sounds: Normal breath sounds. No wheezing. No rales.   Cardiovascular:      Normal rate. Regular rhythm.      S4 Gallop. No S3 gallop. No rub.   Edema:     Peripheral edema absent.   Abdominal:      General: Bowel sounds are normal. There is no distension.      Palpations: Abdomen is soft. There is no abdominal mass.      Tenderness: There is no abdominal tenderness.   Musculoskeletal: Normal range of motion.      Cervical back: Neck supple. Skin:     General: Skin is warm and dry.      Findings: No erythema.   Neurological:      Mental Status: Alert " and oriented to person, place, and time.         Result Review :     Common labs    Common Labs 4/1/22 8/2/22 8/2/22 8/3/22     1328 1328    Glucose 115 (A)  99 130 (A)   BUN 30 (A)  14 14   Creatinine 1.58 (A)  1.53 (A) 1.36 (A)   Sodium 137  138 141   Potassium 3.9  4.1 4.2   Chloride 106  107 107   Calcium 8.9  9.1 9.3   Albumin   4.20    Total Bilirubin   0.2    Alkaline Phosphatase   63    AST (SGOT)   13    ALT (SGPT)   21    WBC  6.81     Hemoglobin  14.3     Hematocrit  41.0     Platelets  148     (A) Abnormal value                      Assessment and Plan    1. Coronary artery disease due to lipid rich plaque  Stable free of angina    2. History of coronary artery bypass graft  Stable    3. Essential hypertension  Controlled    4. Grade II diastolic dysfunction  Euvolemic    5. Hyperlipidemia LDL goal <70  Statin intolerant continue PCSK9 inhibitor and Zetia    6. Tobacco abuse  Encouraged to discontinue    7. Stage 3a chronic kidney disease (HCC)  Stable    8. Allergy to contrast media (used for diagnostic x-rays)      Rishi is free of angina euvolemic.  He we will discontinue his isosorbide mononitrate.  He will communicate back whether his headache and dizziness is completely resolved.  He will monitor his blood pressure and report if it exceeds 130/80.  I will arrange for follow-up in 3 months sooner if needed.        Follow Up   No follow-ups on file.  Patient was given instructions and counseling regarding his condition or for health maintenance advice. Please see specific information pulled into the AVS if appropriate.

## 2022-09-21 ENCOUNTER — OFFICE VISIT (OUTPATIENT)
Dept: CARDIOLOGY | Facility: CLINIC | Age: 67
End: 2022-09-21

## 2022-09-21 VITALS
SYSTOLIC BLOOD PRESSURE: 120 MMHG | WEIGHT: 188 LBS | HEART RATE: 76 BPM | BODY MASS INDEX: 28.49 KG/M2 | HEIGHT: 68 IN | DIASTOLIC BLOOD PRESSURE: 82 MMHG

## 2022-09-21 DIAGNOSIS — N18.31 STAGE 3A CHRONIC KIDNEY DISEASE: ICD-10-CM

## 2022-09-21 DIAGNOSIS — I25.10 CORONARY ARTERY DISEASE DUE TO LIPID RICH PLAQUE: Primary | ICD-10-CM

## 2022-09-21 DIAGNOSIS — I10 ESSENTIAL HYPERTENSION: ICD-10-CM

## 2022-09-21 DIAGNOSIS — Z72.0 TOBACCO ABUSE: ICD-10-CM

## 2022-09-21 DIAGNOSIS — R51.9 FREQUENT HEADACHES: ICD-10-CM

## 2022-09-21 DIAGNOSIS — I25.83 CORONARY ARTERY DISEASE DUE TO LIPID RICH PLAQUE: Primary | ICD-10-CM

## 2022-09-21 DIAGNOSIS — Z95.1 HISTORY OF CORONARY ARTERY BYPASS GRAFT: ICD-10-CM

## 2022-09-21 DIAGNOSIS — E78.5 HYPERLIPIDEMIA LDL GOAL <70: ICD-10-CM

## 2022-09-21 DIAGNOSIS — Z91.041 ALLERGY TO CONTRAST MEDIA (USED FOR DIAGNOSTIC X-RAYS): ICD-10-CM

## 2022-09-21 DIAGNOSIS — I51.89 GRADE II DIASTOLIC DYSFUNCTION: ICD-10-CM

## 2022-09-21 PROCEDURE — 99214 OFFICE O/P EST MOD 30 MIN: CPT | Performed by: INTERNAL MEDICINE

## 2022-09-29 NOTE — TELEPHONE ENCOUNTER
Patient was told he would be referred to neurology for headaches and dizziness. Patient would like to move forward with referral.

## 2022-10-04 ENCOUNTER — TELEPHONE (OUTPATIENT)
Dept: CARDIOLOGY | Facility: CLINIC | Age: 67
End: 2022-10-04

## 2022-10-04 NOTE — TELEPHONE ENCOUNTER
10/4/22-Dr Yunior Crook  Pt: Rishi Galicia  : 1955  Phone: [phone]  Reason for Call:  Dr Mojica took pt off of Isosorbide because of headaches and dizziness. Pt. Was told to see a neuro dr. His appt.is in Feb.   Pt. Wants to know if there is another medication that he can be put on that will do the same as the isosorbide. He is worried due to the fact that Dr Crook told him he would have to be on the isosorbide the rest of his life. He would like a call please.

## 2022-10-04 NOTE — TELEPHONE ENCOUNTER
Please tell patient that the amlodipine which he is on 2.5 mg is a vasodilator and can replace the fact of isosorbide mononitrate.  If we need to we can advance a depending on his blood pressure.

## 2025-03-22 NOTE — PLAN OF CARE
Goal Outcome Evaluation:  Plan of Care Reviewed With: patient  Progress: improving    Goal met for discharge.   Left message on voicemail to return our call.

## (undated) DEVICE — INTRO SHEATH ART/FEM ENGAGE .035 5F12CM

## (undated) DEVICE — Device

## (undated) DEVICE — PK CATH CARD 40

## (undated) DEVICE — GW EMR FIX EXCHG J STD .035 3MM 260CM

## (undated) DEVICE — INTRO SHEATH ART/FEM ENGAGE .035 6F12CM

## (undated) DEVICE — GLIDESHEATH SLENDER STAINLESS STEEL KIT: Brand: GLIDESHEATH SLENDER

## (undated) DEVICE — CATH DIAG IMPULSE FR4 6F 100CM

## (undated) DEVICE — TR BAND RADIAL ARTERY COMPRESSION DEVICE: Brand: TR BAND

## (undated) DEVICE — CATH VENT MIV RADL PIG ST TIP 5F 110CM

## (undated) DEVICE — CATH DIAG IMPULSE FR4 5F 100CM

## (undated) DEVICE — DEV INDEFLATOR P/N 580289

## (undated) DEVICE — KT MANIFLD CARDIAC

## (undated) DEVICE — NC TREK™ CORONARY DILATATION CATHETER 4.5 MM X 15 MM / RAPID-EXCHANGE: Brand: NC TREK™

## (undated) DEVICE — ANGIO-SEAL VIP VASCULAR CLOSURE DEVICE: Brand: ANGIO-SEAL

## (undated) DEVICE — CATH DIAG IMPULSE IMT 6F 100CM

## (undated) DEVICE — CATH DIAG IMPULSE FL4 5F 100CM

## (undated) DEVICE — GUIDE CATHETER: Brand: MACH1™

## (undated) DEVICE — CATH DIAG IMPULSE IM 5F 100CM

## (undated) DEVICE — HI-TORQUE BALANCE MIDDLEWEIGHT GUIDE WIRE .014 STRAIGHT TIP 3.0 CM X 190 CM: Brand: HI-TORQUE BALANCE MIDDLEWEIGHT

## (undated) DEVICE — CATH DIAG IMPULSE FL4 6F 100CM

## (undated) DEVICE — CATH DIAG IMPULSE M/ PK ANGL 6FR

## (undated) DEVICE — CATH DIAG EXPO .045 FL3  5F 100CM

## (undated) DEVICE — TREK CORONARY DILATATION CATHETER 2.50 MM X 20 MM / RAPID-EXCHANGE: Brand: TREK

## (undated) DEVICE — HI-TORQUE WHISPER ES GUIDE WIRE .014 STRAIGHTTIP 3.0 CM X 190 CM: Brand: HI-TORQUE WHISPER

## (undated) DEVICE — GUIDELINER CATHETERS ARE INTENDED TO BE USED IN CONJUNCTION WITH GUIDE CATHETERS TO ACCESS DISCRETE REGIONS OF THE CORONARY AND/OR PERIPHERAL VASCULATURE, AND TO FACILITATE PLACEMENT OF INTERVENTIONAL DEVICES.: Brand: GUIDELINER® V3 CATHETER

## (undated) DEVICE — CATH DIAG IMPULSE PIG145 6F 110CM

## (undated) DEVICE — CATH DIAG IMPULSE IMT 5F 100CM

## (undated) DEVICE — 6F .070 JR 4 100CM: Brand: CORDIS